# Patient Record
Sex: MALE | Race: WHITE | Employment: UNEMPLOYED | URBAN - METROPOLITAN AREA
[De-identification: names, ages, dates, MRNs, and addresses within clinical notes are randomized per-mention and may not be internally consistent; named-entity substitution may affect disease eponyms.]

---

## 2017-01-20 RX ORDER — ASPIRIN 81 MG
TABLET, DELAYED RELEASE (ENTERIC COATED) ORAL
Qty: 90 TABLET | Refills: 0 | Status: SHIPPED | OUTPATIENT
Start: 2017-01-20 | End: 2017-04-18

## 2017-04-03 ENCOUNTER — APPOINTMENT (OUTPATIENT)
Dept: LAB | Age: 62
End: 2017-04-03
Attending: INTERNAL MEDICINE
Payer: MEDICAID

## 2017-04-03 DIAGNOSIS — E11.9 TYPE II OR UNSPECIFIED TYPE DIABETES MELLITUS WITHOUT MENTION OF COMPLICATION, NOT STATED AS UNCONTROLLED: ICD-10-CM

## 2017-04-03 PROCEDURE — 36415 COLL VENOUS BLD VENIPUNCTURE: CPT

## 2017-04-03 PROCEDURE — 80053 COMPREHEN METABOLIC PANEL: CPT

## 2017-04-03 PROCEDURE — 83036 HEMOGLOBIN GLYCOSYLATED A1C: CPT

## 2017-04-04 DIAGNOSIS — E11.9 TYPE 2 DIABETES MELLITUS WITHOUT COMPLICATION, WITHOUT LONG-TERM CURRENT USE OF INSULIN (HCC): Primary | Chronic | ICD-10-CM

## 2017-04-06 ENCOUNTER — OFFICE VISIT (OUTPATIENT)
Dept: INTERNAL MEDICINE CLINIC | Facility: CLINIC | Age: 62
End: 2017-04-06

## 2017-04-06 VITALS
TEMPERATURE: 98 F | OXYGEN SATURATION: 100 % | RESPIRATION RATE: 15 BRPM | BODY MASS INDEX: 37.9 KG/M2 | DIASTOLIC BLOOD PRESSURE: 71 MMHG | HEART RATE: 62 BPM | WEIGHT: 270.75 LBS | HEIGHT: 70.75 IN | SYSTOLIC BLOOD PRESSURE: 140 MMHG

## 2017-04-06 DIAGNOSIS — I10 ESSENTIAL HYPERTENSION, BENIGN: Chronic | ICD-10-CM

## 2017-04-06 DIAGNOSIS — E11.9 TYPE 2 DIABETES MELLITUS WITHOUT COMPLICATION, WITHOUT LONG-TERM CURRENT USE OF INSULIN (HCC): Primary | Chronic | ICD-10-CM

## 2017-04-06 PROCEDURE — 99213 OFFICE O/P EST LOW 20 MIN: CPT | Performed by: INTERNAL MEDICINE

## 2017-04-06 RX ORDER — HYDROCODONE BITARTRATE AND ACETAMINOPHEN 10; 325 MG/1; MG/1
1 TABLET ORAL 3 TIMES DAILY PRN
Qty: 30 TABLET | Refills: 0 | Status: SHIPPED | OUTPATIENT
Start: 2017-04-06 | End: 2017-09-19

## 2017-04-06 NOTE — PROGRESS NOTES
Norris Estevez  10/31/1955 is a 64year old male.     Patient presents with:  Diabetes: dm and b/p follow up        HPI:   No new cc  No swelling of the feet    Current Outpatient Prescriptions:  HYDROcodone-acetaminophen  MG Oral Tab Take 1 tab Comment: 2-3 per month       REVIEW OF SYSTEMS:   Cardiovascular:   Syncope none. Rapid heart beat at rest no. Change in exercise tolerance no. Chest pain no. Chest pain while awake none. Cold extremities no. Dizziness no. Dyspnea on exertion none.  Faintin

## 2017-04-18 RX ORDER — ASPIRIN 81 MG
TABLET, DELAYED RELEASE (ENTERIC COATED) ORAL
Qty: 90 TABLET | Refills: 0 | Status: SHIPPED | OUTPATIENT
Start: 2017-04-18 | End: 2017-07-20

## 2017-06-06 ENCOUNTER — TELEPHONE (OUTPATIENT)
Dept: INTERNAL MEDICINE CLINIC | Facility: CLINIC | Age: 62
End: 2017-06-06

## 2017-06-06 NOTE — TELEPHONE ENCOUNTER
I received a fax from [employer] [facility] requesting a phone number update. It has been updated and faxed back.

## 2017-06-08 ENCOUNTER — TELEPHONE (OUTPATIENT)
Dept: INTERNAL MEDICINE CLINIC | Facility: CLINIC | Age: 62
End: 2017-06-08

## 2017-07-20 RX ORDER — ASPIRIN 81 MG
TABLET, DELAYED RELEASE (ENTERIC COATED) ORAL
Qty: 90 TABLET | Refills: 0 | Status: SHIPPED | OUTPATIENT
Start: 2017-07-20 | End: 2017-09-19

## 2017-07-20 NOTE — TELEPHONE ENCOUNTER
Medication doesn't fall under protocol. Approve if needed.       Last OV 4/6/17    Last refill 4/18/17 90 tablets

## 2017-08-07 ENCOUNTER — TELEPHONE (OUTPATIENT)
Dept: INTERNAL MEDICINE CLINIC | Facility: CLINIC | Age: 62
End: 2017-08-07

## 2017-08-07 NOTE — TELEPHONE ENCOUNTER
received a call from [employer] stating that glimepiride is in need of PA. Please call [phone] to initiate PA.

## 2017-08-07 NOTE — TELEPHONE ENCOUNTER
Call PA department and was advised that the previous PA had ended 3 days ago. Faxing new PA form this afternoon to be completed and resubmitted. Pt had hit max dose, therefore new PA must be done as well.

## 2017-08-08 ENCOUNTER — TELEPHONE (OUTPATIENT)
Dept: INTERNAL MEDICINE CLINIC | Facility: CLINIC | Age: 62
End: 2017-08-08

## 2017-08-09 ENCOUNTER — TELEPHONE (OUTPATIENT)
Dept: INTERNAL MEDICINE CLINIC | Facility: CLINIC | Age: 62
End: 2017-08-09

## 2017-08-22 ENCOUNTER — TELEPHONE (OUTPATIENT)
Dept: INTERNAL MEDICINE CLINIC | Facility: CLINIC | Age: 62
End: 2017-08-22

## 2017-08-22 DIAGNOSIS — I10 ESSENTIAL HYPERTENSION, BENIGN: Chronic | ICD-10-CM

## 2017-08-22 DIAGNOSIS — E78.00 HYPERCHOLESTEREMIA: Chronic | ICD-10-CM

## 2017-08-22 DIAGNOSIS — Z00.00 BLOOD TESTS FOR ROUTINE GENERAL PHYSICAL EXAMINATION: Primary | ICD-10-CM

## 2017-08-22 DIAGNOSIS — E11.9 DIABETES MELLITUS WITHOUT COMPLICATION (HCC): ICD-10-CM

## 2017-08-22 NOTE — TELEPHONE ENCOUNTER
CPX scheduled pt requests lab orders prior to appt    Future Appointments  Date Time Provider Dmitri Martin   9/19/2017 11:00 AM Dutch Manriquez MD [facility] [place]       Pt advised we are no longer in network w/ [facility] after Sept 30.   Verbalized

## 2017-09-02 DIAGNOSIS — I10 ESSENTIAL HYPERTENSION, BENIGN: Chronic | ICD-10-CM

## 2017-09-05 RX ORDER — AMLODIPINE BESYLATE 10 MG/1
TABLET ORAL
Qty: 90 TABLET | Refills: 0 | Status: SHIPPED | OUTPATIENT
Start: 2017-09-05 | End: 2017-12-01

## 2017-09-05 RX ORDER — LISINOPRIL AND HYDROCHLOROTHIAZIDE 20; 12.5 MG/1; MG/1
TABLET ORAL
Qty: 180 TABLET | Refills: 0 | Status: SHIPPED | OUTPATIENT
Start: 2017-09-05 | End: 2017-12-01

## 2017-09-05 RX ORDER — METOPROLOL SUCCINATE 50 MG/1
TABLET, EXTENDED RELEASE ORAL
Qty: 90 TABLET | Refills: 0 | Status: SHIPPED | OUTPATIENT
Start: 2017-09-05 | End: 2017-12-01

## 2017-09-14 ENCOUNTER — LABORATORY ENCOUNTER (OUTPATIENT)
Dept: LAB | Age: 62
End: 2017-09-14
Attending: INTERNAL MEDICINE
Payer: COMMERCIAL

## 2017-09-14 DIAGNOSIS — Z00.00 BLOOD TESTS FOR ROUTINE GENERAL PHYSICAL EXAMINATION: ICD-10-CM

## 2017-09-14 DIAGNOSIS — I10 ESSENTIAL HYPERTENSION, BENIGN: ICD-10-CM

## 2017-09-14 DIAGNOSIS — E78.00 HYPERCHOLESTEREMIA: Chronic | ICD-10-CM

## 2017-09-14 DIAGNOSIS — E11.9 DIABETES MELLITUS WITHOUT COMPLICATION (HCC): ICD-10-CM

## 2017-09-14 DIAGNOSIS — E11.9 TYPE 2 DIABETES MELLITUS WITHOUT COMPLICATION, WITHOUT LONG-TERM CURRENT USE OF INSULIN (HCC): Chronic | ICD-10-CM

## 2017-09-14 LAB
ALBUMIN SERPL-MCNC: 3.6 G/DL (ref 3.5–4.8)
ALP LIVER SERPL-CCNC: 57 U/L (ref 45–117)
ALT SERPL-CCNC: 20 U/L (ref 17–63)
AST SERPL-CCNC: 13 U/L (ref 15–41)
BASOPHILS # BLD AUTO: 0.08 X10(3) UL (ref 0–0.1)
BASOPHILS NFR BLD AUTO: 0.9 %
BILIRUB SERPL-MCNC: 0.5 MG/DL (ref 0.1–2)
BILIRUB UR QL STRIP.AUTO: NEGATIVE
BUN BLD-MCNC: 26 MG/DL (ref 8–20)
CALCIUM BLD-MCNC: 9.3 MG/DL (ref 8.3–10.3)
CHLORIDE: 102 MMOL/L (ref 101–111)
CHOLEST SMN-MCNC: 172 MG/DL (ref ?–200)
CLARITY UR REFRACT.AUTO: CLEAR
CO2: 29 MMOL/L (ref 22–32)
COMPLEXED PSA SERPL-MCNC: 0.71 NG/ML (ref 0.01–4)
CREAT BLD-MCNC: 1.03 MG/DL (ref 0.7–1.3)
CREAT UR-SCNC: 109 MG/DL
EOSINOPHIL # BLD AUTO: 0.27 X10(3) UL (ref 0–0.3)
EOSINOPHIL NFR BLD AUTO: 3.1 %
ERYTHROCYTE [DISTWIDTH] IN BLOOD BY AUTOMATED COUNT: 13.2 % (ref 11.5–16)
EST. AVERAGE GLUCOSE BLD GHB EST-MCNC: 140 MG/DL (ref 68–126)
GLUCOSE BLD-MCNC: 141 MG/DL (ref 70–99)
GLUCOSE UR STRIP.AUTO-MCNC: NEGATIVE MG/DL
HBA1C MFR BLD HPLC: 6.5 % (ref ?–5.7)
HCT VFR BLD AUTO: 45.7 % (ref 37–53)
HDLC SERPL-MCNC: 45 MG/DL (ref 45–?)
HDLC SERPL: 3.82 {RATIO} (ref ?–4.97)
HGB BLD-MCNC: 15.6 G/DL (ref 13–17)
IMMATURE GRANULOCYTE COUNT: 0.05 X10(3) UL (ref 0–1)
IMMATURE GRANULOCYTE RATIO %: 0.6 %
KETONES UR STRIP.AUTO-MCNC: NEGATIVE MG/DL
LDLC SERPL CALC-MCNC: 107 MG/DL (ref ?–130)
LDLC SERPL-MCNC: 20 MG/DL (ref 5–40)
LEUKOCYTE ESTERASE UR QL STRIP.AUTO: NEGATIVE
LYMPHOCYTES # BLD AUTO: 3.07 X10(3) UL (ref 0.9–4)
LYMPHOCYTES NFR BLD AUTO: 34.8 %
M PROTEIN MFR SERPL ELPH: 8 G/DL (ref 6.1–8.3)
MCH RBC QN AUTO: 32.2 PG (ref 27–33.2)
MCHC RBC AUTO-ENTMCNC: 34.1 G/DL (ref 31–37)
MCV RBC AUTO: 94.2 FL (ref 80–99)
MICROALBUMIN UR-MCNC: 11 MG/DL
MICROALBUMIN/CREAT 24H UR-RTO: 100.9 UG/MG (ref ?–30)
MONOCYTES # BLD AUTO: 0.93 X10(3) UL (ref 0.1–0.6)
MONOCYTES NFR BLD AUTO: 10.5 %
NEUTROPHIL ABS PRELIM: 4.42 X10 (3) UL (ref 1.3–6.7)
NEUTROPHILS # BLD AUTO: 4.42 X10(3) UL (ref 1.3–6.7)
NEUTROPHILS NFR BLD AUTO: 50.1 %
NITRITE UR QL STRIP.AUTO: NEGATIVE
NONHDLC SERPL-MCNC: 127 MG/DL (ref ?–130)
PH UR STRIP.AUTO: 6 [PH] (ref 4.5–8)
PLATELET # BLD AUTO: 179 10(3)UL (ref 150–450)
POTASSIUM SERPL-SCNC: 4.5 MMOL/L (ref 3.6–5.1)
PROT UR STRIP.AUTO-MCNC: NEGATIVE MG/DL
RBC # BLD AUTO: 4.85 X10(6)UL (ref 4.3–5.7)
RBC UR QL AUTO: NEGATIVE
RED CELL DISTRIBUTION WIDTH-SD: 45.3 FL (ref 35.1–46.3)
SODIUM SERPL-SCNC: 137 MMOL/L (ref 136–144)
SP GR UR STRIP.AUTO: 1.02 (ref 1–1.03)
THYROXINE (T4): 9.1 UG/DL (ref 4.5–10.9)
TRIGLYCERIDES: 99 MG/DL (ref ?–150)
UROBILINOGEN UR STRIP.AUTO-MCNC: <2 MG/DL
WBC # BLD AUTO: 8.8 X10(3) UL (ref 4–13)

## 2017-09-14 PROCEDURE — 80061 LIPID PANEL: CPT | Performed by: INTERNAL MEDICINE

## 2017-09-14 PROCEDURE — 84436 ASSAY OF TOTAL THYROXINE: CPT | Performed by: INTERNAL MEDICINE

## 2017-09-14 PROCEDURE — 82570 ASSAY OF URINE CREATININE: CPT | Performed by: INTERNAL MEDICINE

## 2017-09-14 PROCEDURE — 82043 UR ALBUMIN QUANTITATIVE: CPT | Performed by: INTERNAL MEDICINE

## 2017-09-14 PROCEDURE — 80053 COMPREHEN METABOLIC PANEL: CPT | Performed by: INTERNAL MEDICINE

## 2017-09-14 PROCEDURE — 84153 ASSAY OF PSA TOTAL: CPT | Performed by: INTERNAL MEDICINE

## 2017-09-14 PROCEDURE — 36415 COLL VENOUS BLD VENIPUNCTURE: CPT | Performed by: INTERNAL MEDICINE

## 2017-09-14 PROCEDURE — 85025 COMPLETE CBC W/AUTO DIFF WBC: CPT | Performed by: INTERNAL MEDICINE

## 2017-09-14 PROCEDURE — 81003 URINALYSIS AUTO W/O SCOPE: CPT | Performed by: INTERNAL MEDICINE

## 2017-09-14 PROCEDURE — 83036 HEMOGLOBIN GLYCOSYLATED A1C: CPT | Performed by: INTERNAL MEDICINE

## 2017-09-19 ENCOUNTER — OFFICE VISIT (OUTPATIENT)
Dept: INTERNAL MEDICINE CLINIC | Facility: CLINIC | Age: 62
End: 2017-09-19

## 2017-09-19 VITALS
HEART RATE: 56 BPM | DIASTOLIC BLOOD PRESSURE: 70 MMHG | TEMPERATURE: 99 F | OXYGEN SATURATION: 99 % | BODY MASS INDEX: 37.1 KG/M2 | SYSTOLIC BLOOD PRESSURE: 122 MMHG | HEIGHT: 70.75 IN | WEIGHT: 265 LBS | RESPIRATION RATE: 16 BRPM

## 2017-09-19 DIAGNOSIS — Z00.00 ROUTINE GENERAL MEDICAL EXAMINATION AT A HEALTH CARE FACILITY: Primary | ICD-10-CM

## 2017-09-19 DIAGNOSIS — E11.9 TYPE 2 DIABETES MELLITUS WITHOUT COMPLICATION, WITHOUT LONG-TERM CURRENT USE OF INSULIN (HCC): Chronic | ICD-10-CM

## 2017-09-19 DIAGNOSIS — I10 ESSENTIAL HYPERTENSION, BENIGN: Chronic | ICD-10-CM

## 2017-09-19 PROCEDURE — 99396 PREV VISIT EST AGE 40-64: CPT | Performed by: INTERNAL MEDICINE

## 2017-09-19 PROCEDURE — 93000 ELECTROCARDIOGRAM COMPLETE: CPT | Performed by: INTERNAL MEDICINE

## 2017-09-19 NOTE — PROGRESS NOTES
Mal Estevez  10/31/1955 is a 64year old male. Patient presents with:  Physical      HPI:   Here for cpx -see below     Current Outpatient Prescriptions:  aspirin 81 MG Oral Tab Take 81 mg by mouth daily.  Disp:  Rfl:    AMLODIPINE BESYLATE 10 M SYSTEMS:     General/Constitutional:   General able to do usual activities, good exercise tolerance, good general state of health, no fatigue, no fever, no weakness . HEENT/Neck:   Head no dizziness, no lightheadedness.  Eyes normal vision, no redness , n consciousness, memory loss, seizures, paresthesias, weakness. Tingling/numbness none. Trouble with balance none. Psychiatric:   Patient denies anxiety, depression, hallucinations. Insomnia none/no hx of sleep disorder. Memory loss none.                 EX normalInvoluntary movements and wasting none. Reflexes: normal.   Sensory: all sensory modalities normal.   LYMPHATICS:   Cervical: none. Groin: no adenopathy . Inguinal: no adenopathy. Supraclavicular: none.    DERMATOLOGY:   Rash: no. But has mulu

## 2017-09-28 ENCOUNTER — PATIENT MESSAGE (OUTPATIENT)
Dept: INTERNAL MEDICINE CLINIC | Facility: CLINIC | Age: 62
End: 2017-09-28

## 2017-10-04 DIAGNOSIS — E11.9 TYPE 2 DIABETES MELLITUS WITHOUT COMPLICATION, WITHOUT LONG-TERM CURRENT USE OF INSULIN (HCC): Chronic | ICD-10-CM

## 2017-10-05 RX ORDER — GLIMEPIRIDE 2 MG/1
TABLET ORAL
Qty: 135 TABLET | Refills: 0 | Status: SHIPPED | OUTPATIENT
Start: 2017-10-05 | End: 2017-12-28

## 2017-10-05 RX ORDER — GLIMEPIRIDE 2 MG/1
TABLET ORAL
Qty: 135 TABLET | Refills: 3
Start: 2017-10-05

## 2017-10-05 NOTE — TELEPHONE ENCOUNTER
From: Oliva Estevez  Sent: 10/4/2017 9:14 AM CDT  Subject: Medication Renewal Request    Cecilgeorge Eriberto would like a refill of the following medications:     glimepiride 2 MG Oral Tab Jerilyn Pierson MD]    Preferred pharmacy: [place] [id number]

## 2017-11-16 RX ORDER — ASPIRIN 81 MG
TABLET, DELAYED RELEASE (ENTERIC COATED) ORAL
Qty: 90 TABLET | Refills: 3 | Status: SHIPPED | OUTPATIENT
Start: 2017-11-16 | End: 2018-03-29

## 2017-12-01 DIAGNOSIS — I10 ESSENTIAL HYPERTENSION, BENIGN: Chronic | ICD-10-CM

## 2017-12-01 RX ORDER — METOPROLOL SUCCINATE 50 MG/1
TABLET, EXTENDED RELEASE ORAL
Qty: 90 TABLET | Refills: 0 | Status: SHIPPED | OUTPATIENT
Start: 2017-12-01 | End: 2018-02-26

## 2017-12-01 RX ORDER — LISINOPRIL AND HYDROCHLOROTHIAZIDE 20; 12.5 MG/1; MG/1
TABLET ORAL
Qty: 180 TABLET | Refills: 0 | Status: SHIPPED | OUTPATIENT
Start: 2017-12-01 | End: 2018-02-26

## 2017-12-01 RX ORDER — AMLODIPINE BESYLATE 10 MG/1
TABLET ORAL
Qty: 90 TABLET | Refills: 0 | Status: SHIPPED | OUTPATIENT
Start: 2017-12-01 | End: 2018-02-26

## 2017-12-28 DIAGNOSIS — E11.9 TYPE 2 DIABETES MELLITUS WITHOUT COMPLICATION, WITHOUT LONG-TERM CURRENT USE OF INSULIN (HCC): Chronic | ICD-10-CM

## 2017-12-28 RX ORDER — GLIMEPIRIDE 2 MG/1
TABLET ORAL
Qty: 135 TABLET | Refills: 0 | Status: SHIPPED | OUTPATIENT
Start: 2017-12-28 | End: 2018-03-23

## 2018-02-26 DIAGNOSIS — I10 ESSENTIAL HYPERTENSION, BENIGN: Chronic | ICD-10-CM

## 2018-02-26 RX ORDER — AMLODIPINE BESYLATE 10 MG/1
TABLET ORAL
Qty: 90 TABLET | Refills: 0 | Status: SHIPPED | OUTPATIENT
Start: 2018-02-26 | End: 2018-05-23

## 2018-02-26 RX ORDER — LISINOPRIL AND HYDROCHLOROTHIAZIDE 20; 12.5 MG/1; MG/1
TABLET ORAL
Qty: 180 TABLET | Refills: 0 | Status: SHIPPED | OUTPATIENT
Start: 2018-02-26 | End: 2018-05-23

## 2018-02-26 RX ORDER — METOPROLOL SUCCINATE 50 MG/1
TABLET, EXTENDED RELEASE ORAL
Qty: 90 TABLET | Refills: 0 | Status: SHIPPED | OUTPATIENT
Start: 2018-02-26 | End: 2018-05-23

## 2018-03-16 NOTE — TELEPHONE ENCOUNTER
From: Sridhar Estevez  To: Leandra Ponce MD  Sent: 9/28/2017 2:27 PM CDT  Subject: Other    I had my dialated retinal eye exam done on 9/21/2017 at [facility]. They are going to send this information to your office.    Taty Statement Selected

## 2018-03-23 DIAGNOSIS — E11.9 TYPE 2 DIABETES MELLITUS WITHOUT COMPLICATION, WITHOUT LONG-TERM CURRENT USE OF INSULIN (HCC): Chronic | ICD-10-CM

## 2018-03-26 ENCOUNTER — TELEPHONE (OUTPATIENT)
Dept: INTERNAL MEDICINE CLINIC | Facility: CLINIC | Age: 63
End: 2018-03-26

## 2018-03-26 DIAGNOSIS — E11.9 DIABETES MELLITUS WITHOUT COMPLICATION (HCC): ICD-10-CM

## 2018-03-26 DIAGNOSIS — E78.00 HYPERCHOLESTEREMIA: Primary | Chronic | ICD-10-CM

## 2018-03-26 DIAGNOSIS — E11.9 TYPE 2 DIABETES MELLITUS WITHOUT COMPLICATION, WITHOUT LONG-TERM CURRENT USE OF INSULIN (HCC): Chronic | ICD-10-CM

## 2018-03-26 RX ORDER — GLIMEPIRIDE 2 MG/1
TABLET ORAL
Qty: 45 TABLET | Refills: 0 | Status: SHIPPED | OUTPATIENT
Start: 2018-03-26 | End: 2018-03-26

## 2018-03-26 NOTE — TELEPHONE ENCOUNTER
Patient will have labs done this week. Patient also has a follow up appointment scheduled.  30 day refill sent

## 2018-03-26 NOTE — TELEPHONE ENCOUNTER
Patient scheduled appt for diabetes f/u.  Requesting to have blood orders entered prior  Future Appointments  Date Time Provider Dmitri Martin   3/29/2018 9:30 AM Mónica Kyle MD [facility] [place]     Patient also requesting refill for:  GLIMEPIRIDE

## 2018-03-27 ENCOUNTER — APPOINTMENT (OUTPATIENT)
Dept: LAB | Age: 63
End: 2018-03-27
Attending: INTERNAL MEDICINE
Payer: COMMERCIAL

## 2018-03-27 DIAGNOSIS — E11.9 DIABETES MELLITUS WITHOUT COMPLICATION (HCC): ICD-10-CM

## 2018-03-27 DIAGNOSIS — E78.00 HYPERCHOLESTEREMIA: Chronic | ICD-10-CM

## 2018-03-27 LAB
ALBUMIN SERPL-MCNC: 3.5 G/DL (ref 3.5–4.8)
ALP LIVER SERPL-CCNC: 56 U/L (ref 45–117)
ALT SERPL-CCNC: 16 U/L (ref 17–63)
AST SERPL-CCNC: 13 U/L (ref 15–41)
BILIRUB SERPL-MCNC: 0.7 MG/DL (ref 0.1–2)
BUN BLD-MCNC: 18 MG/DL (ref 8–20)
CALCIUM BLD-MCNC: 9.3 MG/DL (ref 8.3–10.3)
CHLORIDE: 100 MMOL/L (ref 101–111)
CHOLEST SMN-MCNC: 180 MG/DL (ref ?–200)
CO2: 28 MMOL/L (ref 22–32)
CREAT BLD-MCNC: 0.99 MG/DL (ref 0.7–1.3)
EST. AVERAGE GLUCOSE BLD GHB EST-MCNC: 146 MG/DL (ref 68–126)
GLUCOSE BLD-MCNC: 152 MG/DL (ref 70–99)
HBA1C MFR BLD HPLC: 6.7 % (ref ?–5.7)
HDLC SERPL-MCNC: 48 MG/DL (ref 45–?)
HDLC SERPL: 3.75 {RATIO} (ref ?–4.97)
LDLC SERPL CALC-MCNC: 113 MG/DL (ref ?–130)
M PROTEIN MFR SERPL ELPH: 8 G/DL (ref 6.1–8.3)
NONHDLC SERPL-MCNC: 132 MG/DL (ref ?–130)
POTASSIUM SERPL-SCNC: 4.3 MMOL/L (ref 3.6–5.1)
SODIUM SERPL-SCNC: 134 MMOL/L (ref 136–144)
TRIGL SERPL-MCNC: 96 MG/DL (ref ?–150)
VLDLC SERPL CALC-MCNC: 19 MG/DL (ref 5–40)

## 2018-03-27 PROCEDURE — 80061 LIPID PANEL: CPT | Performed by: INTERNAL MEDICINE

## 2018-03-27 PROCEDURE — 36415 COLL VENOUS BLD VENIPUNCTURE: CPT | Performed by: INTERNAL MEDICINE

## 2018-03-27 PROCEDURE — 80053 COMPREHEN METABOLIC PANEL: CPT | Performed by: INTERNAL MEDICINE

## 2018-03-27 PROCEDURE — 83036 HEMOGLOBIN GLYCOSYLATED A1C: CPT | Performed by: INTERNAL MEDICINE

## 2018-03-27 RX ORDER — GLIMEPIRIDE 2 MG/1
TABLET ORAL
Qty: 135 TABLET | Refills: 0 | Status: SHIPPED | OUTPATIENT
Start: 2018-03-27 | End: 2018-06-21

## 2018-03-29 ENCOUNTER — OFFICE VISIT (OUTPATIENT)
Dept: INTERNAL MEDICINE CLINIC | Facility: CLINIC | Age: 63
End: 2018-03-29

## 2018-03-29 VITALS
OXYGEN SATURATION: 93 % | HEIGHT: 71 IN | BODY MASS INDEX: 39.34 KG/M2 | WEIGHT: 281 LBS | SYSTOLIC BLOOD PRESSURE: 132 MMHG | RESPIRATION RATE: 16 BRPM | HEART RATE: 58 BPM | DIASTOLIC BLOOD PRESSURE: 70 MMHG | TEMPERATURE: 98 F

## 2018-03-29 DIAGNOSIS — E11.9 TYPE 2 DIABETES MELLITUS WITHOUT COMPLICATION, WITHOUT LONG-TERM CURRENT USE OF INSULIN (HCC): Primary | Chronic | ICD-10-CM

## 2018-03-29 PROCEDURE — 99213 OFFICE O/P EST LOW 20 MIN: CPT | Performed by: INTERNAL MEDICINE

## 2018-03-29 PROCEDURE — 90471 IMMUNIZATION ADMIN: CPT | Performed by: INTERNAL MEDICINE

## 2018-03-29 PROCEDURE — 90732 PPSV23 VACC 2 YRS+ SUBQ/IM: CPT | Performed by: INTERNAL MEDICINE

## 2018-03-29 RX ORDER — HYDROCODONE BITARTRATE AND ACETAMINOPHEN 7.5; 325 MG/1; MG/1
1 TABLET ORAL EVERY 4 HOURS PRN
Qty: 30 TABLET | Refills: 0 | Status: SHIPPED | OUTPATIENT
Start: 2018-03-29 | End: 2018-04-28

## 2018-03-29 NOTE — PROGRESS NOTES
Luis Estevez  10/31/1955 is a 58year old male. Patient presents with: Follow - Up    bp ck    HPI:   DIABETES MELLITUS:   The diet that the patient has been following is the American Diabetes Association diet.    The frequency of the monitoring blood pressure    • Other and unspecified hyperlipidemia    • Rotator cuff disorder     LT   • Type II or unspecified type diabetes mellitus without mention of complication, not stated as uncontrolled    • Unspecified essential hypertension    • Varicose v this visit. The patient indicates understanding of these issues and agrees to the plan. The patient is asked to No Follow-up on file. .  Ck sugar # pre and post meals ,compliance with diet/exercise enforce.  Weight counseling discussed     Rosendo Kenyon

## 2018-05-23 DIAGNOSIS — I10 ESSENTIAL HYPERTENSION, BENIGN: Chronic | ICD-10-CM

## 2018-05-24 RX ORDER — METOPROLOL SUCCINATE 50 MG/1
50 TABLET, EXTENDED RELEASE ORAL DAILY
Qty: 90 TABLET | Refills: 0 | Status: SHIPPED | OUTPATIENT
Start: 2018-05-24 | End: 2018-08-21

## 2018-05-24 RX ORDER — LISINOPRIL AND HYDROCHLOROTHIAZIDE 20; 12.5 MG/1; MG/1
2 TABLET ORAL DAILY
Qty: 180 TABLET | Refills: 0 | Status: SHIPPED | OUTPATIENT
Start: 2018-05-24 | End: 2018-08-21

## 2018-05-24 RX ORDER — AMLODIPINE BESYLATE 10 MG/1
10 TABLET ORAL DAILY
Qty: 90 TABLET | Refills: 0 | Status: SHIPPED | OUTPATIENT
Start: 2018-05-24 | End: 2018-08-21

## 2018-05-24 NOTE — TELEPHONE ENCOUNTER
Refill requested:   Lisinopril - Hydro 20-12.5 2 daily  Metoprolol ER 50 daily  Amlodipine 10 daily    Passed protocol      Last refill:   2/26/18 Lisinopril 2qd #180 NR  2/26/18 Metoprolol 50 #90 NR  2/26/18 Amlodipine 10 mg #90 NR    Relevant Labs: 3/27/

## 2018-06-21 DIAGNOSIS — E11.9 TYPE 2 DIABETES MELLITUS WITHOUT COMPLICATION, WITHOUT LONG-TERM CURRENT USE OF INSULIN (HCC): Chronic | ICD-10-CM

## 2018-06-21 RX ORDER — GLIMEPIRIDE 2 MG/1
TABLET ORAL
Qty: 135 TABLET | Refills: 0 | Status: SHIPPED | OUTPATIENT
Start: 2018-06-21 | End: 2018-09-13

## 2018-07-27 ENCOUNTER — APPOINTMENT (OUTPATIENT)
Dept: GENERAL RADIOLOGY | Age: 63
End: 2018-07-27
Attending: PHYSICIAN ASSISTANT
Payer: COMMERCIAL

## 2018-07-27 ENCOUNTER — HOSPITAL ENCOUNTER (OUTPATIENT)
Age: 63
Discharge: HOME | End: 2018-07-27
Payer: COMMERCIAL

## 2018-07-27 VITALS
OXYGEN SATURATION: 99 % | SYSTOLIC BLOOD PRESSURE: 148 MMHG | DIASTOLIC BLOOD PRESSURE: 68 MMHG | HEART RATE: 55 BPM | TEMPERATURE: 98 F | RESPIRATION RATE: 18 BRPM

## 2018-07-27 DIAGNOSIS — S61.213A LACERATION OF LEFT MIDDLE FINGER WITHOUT FOREIGN BODY WITHOUT DAMAGE TO NAIL, INITIAL ENCOUNTER: ICD-10-CM

## 2018-07-27 DIAGNOSIS — S62.653B OPEN NONDISPLACED FRACTURE OF MIDDLE PHALANX OF LEFT MIDDLE FINGER, INITIAL ENCOUNTER: Primary | ICD-10-CM

## 2018-07-27 DIAGNOSIS — T14.8XXA SKIN AVULSION: ICD-10-CM

## 2018-07-27 PROCEDURE — 73140 X-RAY EXAM OF FINGER(S): CPT | Performed by: PHYSICIAN ASSISTANT

## 2018-07-27 PROCEDURE — 26720 TREAT FINGER FRACTURE EACH: CPT

## 2018-07-27 PROCEDURE — 90471 IMMUNIZATION ADMIN: CPT

## 2018-07-27 PROCEDURE — 99213 OFFICE O/P EST LOW 20 MIN: CPT

## 2018-07-27 PROCEDURE — 99203 OFFICE O/P NEW LOW 30 MIN: CPT

## 2018-07-27 PROCEDURE — 12002 RPR S/N/AX/GEN/TRNK2.6-7.5CM: CPT

## 2018-07-27 RX ORDER — AMOXICILLIN AND CLAVULANATE POTASSIUM 875; 125 MG/1; MG/1
1 TABLET, FILM COATED ORAL 2 TIMES DAILY
Qty: 20 TABLET | Refills: 0 | Status: SHIPPED | OUTPATIENT
Start: 2018-07-27 | End: 2018-08-06

## 2018-07-27 NOTE — ED INITIAL ASSESSMENT (HPI)
C/O laceration to left index and middle finger that occurred today, sts that he caught it on a saw blade.

## 2018-07-27 NOTE — ED PROVIDER NOTES
Patient Seen in: [facility]    History   Patient presents with:  Laceration Abrasion (integumentary)    Stated Complaint: laceration left index and 2nd digit    HPI    Patient is a pleasant 58-year-old male.   He is right-hand do except as noted above.     Physical Exam   ED Triage Vitals [07/27/18 1508]  BP: 148/68  Pulse: 55  Resp: 18  Temp: 98.4 °F (36.9 °C)  Temp src: Temporal  SpO2: 99 %  O2 Device: None (Room air)    Current:/68   Pulse 55   Temp 98.4 °F (36.9 °C) (Tempo to the left index finger is fairly superficial to the distal pad. The laceration to the middle finger is more significant. Plain film x-ray will be performed to see if there is bony involvement. Patient's tetanus was updated.   Both wounds will be thorou

## 2018-08-06 ENCOUNTER — HOSPITAL ENCOUNTER (OUTPATIENT)
Age: 63
Discharge: HOME | End: 2018-08-06
Payer: COMMERCIAL

## 2018-08-06 VITALS
HEART RATE: 61 BPM | RESPIRATION RATE: 18 BRPM | OXYGEN SATURATION: 98 % | DIASTOLIC BLOOD PRESSURE: 74 MMHG | SYSTOLIC BLOOD PRESSURE: 128 MMHG | TEMPERATURE: 99 F

## 2018-08-06 DIAGNOSIS — Z48.02 ENCOUNTER FOR REMOVAL OF SUTURES: Primary | ICD-10-CM

## 2018-08-06 NOTE — ED PROVIDER NOTES
Patient Seen in: [facility]    History   Patient presents with:  Suture Removal    Stated Complaint: suture removal    19-year-old male presents today with need of suture removal.  Patient was here 1 week ago to have sutures shayne stated complaint: suture removal  Other systems are as noted in HPI. Constitutional and vital signs reviewed. All other systems reviewed and negative except as noted above.     Physical Exam   ED Triage Vitals [08/06/18 1014]  BP: 128/74  Pulse: 61  R

## 2018-08-21 DIAGNOSIS — I10 ESSENTIAL HYPERTENSION, BENIGN: Chronic | ICD-10-CM

## 2018-08-21 RX ORDER — LISINOPRIL AND HYDROCHLOROTHIAZIDE 20; 12.5 MG/1; MG/1
2 TABLET ORAL DAILY
Qty: 180 TABLET | Refills: 0 | Status: SHIPPED | OUTPATIENT
Start: 2018-08-21 | End: 2018-11-16

## 2018-08-21 RX ORDER — AMLODIPINE BESYLATE 10 MG/1
TABLET ORAL
Qty: 90 TABLET | Refills: 0 | Status: SHIPPED | OUTPATIENT
Start: 2018-08-21 | End: 2018-11-12

## 2018-08-21 RX ORDER — METOPROLOL SUCCINATE 50 MG/1
TABLET, EXTENDED RELEASE ORAL
Qty: 90 TABLET | Refills: 0 | Status: SHIPPED | OUTPATIENT
Start: 2018-08-21 | End: 2018-09-22

## 2018-08-21 NOTE — TELEPHONE ENCOUNTER
Medication(s) to Refill:   Pending Prescriptions Disp Refills    AMLODIPINE BESYLATE 10 MG Oral Tab [Pharmacy Med Name: AMLODIPINE BESYLATE 10MG TABLETS] 90 tablet 0     Sig: TAKE 1 TABLET(10 MG) BY MOUTH DAILY      METOPROLOL SUCCINATE ER 50 MG Oral Table CO2 22.0 - 32.0 mmol/L 28.0    Resulting Agency  [facility]      Specimen Collected: 03/27/18  8:42 AM Last Resulted: 03/27/18 11:54 AM

## 2018-09-13 DIAGNOSIS — E11.9 TYPE 2 DIABETES MELLITUS WITHOUT COMPLICATION, WITHOUT LONG-TERM CURRENT USE OF INSULIN (HCC): Chronic | ICD-10-CM

## 2018-09-14 DIAGNOSIS — E11.9 TYPE 2 DIABETES MELLITUS WITHOUT COMPLICATION, WITHOUT LONG-TERM CURRENT USE OF INSULIN (HCC): Chronic | ICD-10-CM

## 2018-09-14 RX ORDER — GLIMEPIRIDE 2 MG/1
TABLET ORAL
Qty: 45 TABLET | Refills: 0 | Status: SHIPPED | OUTPATIENT
Start: 2018-09-14 | End: 2018-09-22

## 2018-09-14 NOTE — TELEPHONE ENCOUNTER
Passed protocol - Spoke with patient, he wasn't able to schedule an appointment over the phone and stated that he will call back to schedule. I stated that I can only give him 30 days until he is seen in office. Patient expressed understanding.      Elvin Piña

## 2018-09-17 ENCOUNTER — TELEPHONE (OUTPATIENT)
Dept: INTERNAL MEDICINE CLINIC | Facility: CLINIC | Age: 63
End: 2018-09-17

## 2018-09-17 DIAGNOSIS — Z00.00 LABORATORY EXAMINATION ORDERED AS PART OF A ROUTINE GENERAL MEDICAL EXAMINATION: Primary | ICD-10-CM

## 2018-09-17 DIAGNOSIS — E11.8 TYPE 2 DIABETES MELLITUS WITH COMPLICATION, UNSPECIFIED WHETHER LONG TERM INSULIN USE: ICD-10-CM

## 2018-09-17 DIAGNOSIS — E78.00 HYPERCHOLESTEREMIA: ICD-10-CM

## 2018-09-17 RX ORDER — GLIMEPIRIDE 2 MG/1
TABLET ORAL
Qty: 135 TABLET | Refills: 0 | OUTPATIENT
Start: 2018-09-17

## 2018-09-17 NOTE — TELEPHONE ENCOUNTER
Patient requesting lab orders to be entered prior to upcoming physical    Future Appointments   Date Time Provider Dmitri Martin   9/22/2018 10:00 AM David White MD [facility] [place]

## 2018-09-17 NOTE — TELEPHONE ENCOUNTER
1st attempt - [employer] message sent.      Requesting Glimepiride 2 mg Tablets  LOV: 3/29/18  RTC: 6 months

## 2018-09-18 ENCOUNTER — LAB ENCOUNTER (OUTPATIENT)
Dept: LAB | Age: 63
End: 2018-09-18
Attending: INTERNAL MEDICINE
Payer: COMMERCIAL

## 2018-09-18 DIAGNOSIS — Z00.00 LABORATORY EXAMINATION ORDERED AS PART OF A ROUTINE GENERAL MEDICAL EXAMINATION: ICD-10-CM

## 2018-09-18 DIAGNOSIS — E11.8 TYPE 2 DIABETES MELLITUS WITH COMPLICATION, UNSPECIFIED WHETHER LONG TERM INSULIN USE: ICD-10-CM

## 2018-09-18 DIAGNOSIS — E78.00 HYPERCHOLESTEREMIA: ICD-10-CM

## 2018-09-18 LAB
ALBUMIN SERPL-MCNC: 3.6 G/DL (ref 3.5–4.8)
ALBUMIN/GLOB SERPL: 0.9 {RATIO} (ref 1–2)
ALP LIVER SERPL-CCNC: 54 U/L (ref 45–117)
ALT SERPL-CCNC: 16 U/L (ref 17–63)
ANION GAP SERPL CALC-SCNC: 7 MMOL/L (ref 0–18)
AST SERPL-CCNC: 17 U/L (ref 15–41)
BASOPHILS # BLD AUTO: 0.09 X10(3) UL (ref 0–0.1)
BASOPHILS NFR BLD AUTO: 1.1 %
BILIRUB SERPL-MCNC: 0.6 MG/DL (ref 0.1–2)
BILIRUB UR QL STRIP.AUTO: NEGATIVE
BUN BLD-MCNC: 19 MG/DL (ref 8–20)
BUN/CREAT SERPL: 19.8 (ref 10–20)
CALCIUM BLD-MCNC: 9.4 MG/DL (ref 8.3–10.3)
CHLORIDE SERPL-SCNC: 101 MMOL/L (ref 101–111)
CHOLEST SMN-MCNC: 175 MG/DL (ref ?–200)
CLARITY UR REFRACT.AUTO: CLEAR
CO2 SERPL-SCNC: 25 MMOL/L (ref 22–32)
COLOR UR AUTO: YELLOW
COMPLEXED PSA SERPL-MCNC: 0.7 NG/ML (ref 0.01–4)
CREAT BLD-MCNC: 0.96 MG/DL (ref 0.7–1.3)
CREAT UR-SCNC: 33.9 MG/DL
EOSINOPHIL # BLD AUTO: 0.26 X10(3) UL (ref 0–0.3)
EOSINOPHIL NFR BLD AUTO: 3.1 %
ERYTHROCYTE [DISTWIDTH] IN BLOOD BY AUTOMATED COUNT: 13.7 % (ref 11.5–16)
EST. AVERAGE GLUCOSE BLD GHB EST-MCNC: 120 MG/DL (ref 68–126)
GLOBULIN PLAS-MCNC: 4.1 G/DL (ref 2.5–4)
GLUCOSE BLD-MCNC: 118 MG/DL (ref 70–99)
GLUCOSE UR STRIP.AUTO-MCNC: NEGATIVE MG/DL
HBA1C MFR BLD HPLC: 5.8 % (ref ?–5.7)
HCT VFR BLD AUTO: 46.7 % (ref 37–53)
HDLC SERPL-MCNC: 50 MG/DL (ref 40–59)
HGB BLD-MCNC: 15.5 G/DL (ref 13–17)
IMMATURE GRANULOCYTE COUNT: 0.06 X10(3) UL (ref 0–1)
IMMATURE GRANULOCYTE RATIO %: 0.7 %
KETONES UR STRIP.AUTO-MCNC: NEGATIVE MG/DL
LDLC SERPL CALC-MCNC: 108 MG/DL (ref ?–100)
LEUKOCYTE ESTERASE UR QL STRIP.AUTO: NEGATIVE
LYMPHOCYTES # BLD AUTO: 3.14 X10(3) UL (ref 0.9–4)
LYMPHOCYTES NFR BLD AUTO: 38 %
M PROTEIN MFR SERPL ELPH: 7.7 G/DL (ref 6.1–8.3)
MCH RBC QN AUTO: 31.4 PG (ref 27–33.2)
MCHC RBC AUTO-ENTMCNC: 33.2 G/DL (ref 31–37)
MCV RBC AUTO: 94.7 FL (ref 80–99)
MICROALBUMIN UR-MCNC: 9.99 MG/DL
MICROALBUMIN/CREAT 24H UR-RTO: 294.7 UG/MG (ref ?–30)
MONOCYTES # BLD AUTO: 0.95 X10(3) UL (ref 0.1–1)
MONOCYTES NFR BLD AUTO: 11.5 %
NEUTROPHIL ABS PRELIM: 3.76 X10 (3) UL (ref 1.3–6.7)
NEUTROPHILS # BLD AUTO: 3.76 X10(3) UL (ref 1.3–6.7)
NEUTROPHILS NFR BLD AUTO: 45.6 %
NITRITE UR QL STRIP.AUTO: NEGATIVE
NONHDLC SERPL-MCNC: 125 MG/DL (ref ?–130)
OSMOLALITY SERPL CALC.SUM OF ELEC: 279 MOSM/KG (ref 275–295)
PH UR STRIP.AUTO: 6 [PH] (ref 4.5–8)
PLATELET # BLD AUTO: 201 10(3)UL (ref 150–450)
POTASSIUM SERPL-SCNC: 4.3 MMOL/L (ref 3.6–5.1)
PROT UR STRIP.AUTO-MCNC: NEGATIVE MG/DL
RBC # BLD AUTO: 4.93 X10(6)UL (ref 4.3–5.7)
RBC UR QL AUTO: NEGATIVE
RED CELL DISTRIBUTION WIDTH-SD: 48.6 FL (ref 35.1–46.3)
SODIUM SERPL-SCNC: 133 MMOL/L (ref 136–144)
SP GR UR STRIP.AUTO: 1.01 (ref 1–1.03)
T4 SERPL-MCNC: 8.8 UG/DL (ref 4.5–10.9)
TRIGL SERPL-MCNC: 87 MG/DL (ref 30–149)
UROBILINOGEN UR STRIP.AUTO-MCNC: <2 MG/DL
VLDLC SERPL CALC-MCNC: 17 MG/DL (ref 0–30)
WBC # BLD AUTO: 8.3 X10(3) UL (ref 4–13)

## 2018-09-18 PROCEDURE — 84436 ASSAY OF TOTAL THYROXINE: CPT | Performed by: INTERNAL MEDICINE

## 2018-09-18 PROCEDURE — 83036 HEMOGLOBIN GLYCOSYLATED A1C: CPT | Performed by: INTERNAL MEDICINE

## 2018-09-18 PROCEDURE — 81003 URINALYSIS AUTO W/O SCOPE: CPT | Performed by: INTERNAL MEDICINE

## 2018-09-18 PROCEDURE — 80061 LIPID PANEL: CPT | Performed by: INTERNAL MEDICINE

## 2018-09-18 PROCEDURE — 80053 COMPREHEN METABOLIC PANEL: CPT | Performed by: INTERNAL MEDICINE

## 2018-09-18 PROCEDURE — 85025 COMPLETE CBC W/AUTO DIFF WBC: CPT | Performed by: INTERNAL MEDICINE

## 2018-09-18 PROCEDURE — 82043 UR ALBUMIN QUANTITATIVE: CPT | Performed by: INTERNAL MEDICINE

## 2018-09-18 PROCEDURE — 36415 COLL VENOUS BLD VENIPUNCTURE: CPT | Performed by: INTERNAL MEDICINE

## 2018-09-18 PROCEDURE — 82570 ASSAY OF URINE CREATININE: CPT | Performed by: INTERNAL MEDICINE

## 2018-09-18 PROCEDURE — 84153 ASSAY OF PSA TOTAL: CPT | Performed by: INTERNAL MEDICINE

## 2018-09-22 ENCOUNTER — OFFICE VISIT (OUTPATIENT)
Dept: INTERNAL MEDICINE CLINIC | Facility: CLINIC | Age: 63
End: 2018-09-22
Payer: COMMERCIAL

## 2018-09-22 VITALS
HEART RATE: 53 BPM | DIASTOLIC BLOOD PRESSURE: 82 MMHG | HEIGHT: 70.5 IN | BODY MASS INDEX: 38.33 KG/M2 | RESPIRATION RATE: 12 BRPM | WEIGHT: 270.75 LBS | SYSTOLIC BLOOD PRESSURE: 132 MMHG | TEMPERATURE: 98 F

## 2018-09-22 DIAGNOSIS — Z00.00 ROUTINE GENERAL MEDICAL EXAMINATION AT A HEALTH CARE FACILITY: Primary | ICD-10-CM

## 2018-09-22 DIAGNOSIS — I10 ESSENTIAL HYPERTENSION, BENIGN: ICD-10-CM

## 2018-09-22 DIAGNOSIS — E11.9 TYPE 2 DIABETES MELLITUS WITHOUT COMPLICATION, WITHOUT LONG-TERM CURRENT USE OF INSULIN (HCC): Chronic | ICD-10-CM

## 2018-09-22 DIAGNOSIS — E11.9 DIABETES MELLITUS WITHOUT COMPLICATION (HCC): ICD-10-CM

## 2018-09-22 PROCEDURE — 99396 PREV VISIT EST AGE 40-64: CPT | Performed by: INTERNAL MEDICINE

## 2018-09-22 PROCEDURE — 93000 ELECTROCARDIOGRAM COMPLETE: CPT | Performed by: INTERNAL MEDICINE

## 2018-09-22 RX ORDER — GLIMEPIRIDE 2 MG/1
2 TABLET ORAL
Qty: 45 TABLET | Refills: 0 | COMMUNITY
Start: 2018-09-22 | End: 2019-03-16

## 2018-09-22 RX ORDER — INFLUENZA A VIRUS A/SINGAPORE/GP1908/2015 IVR-180A (H1N1) ANTIGEN (PROPIOLACTONE INACTIVATED), INFLUENZA A VIRUS A/HONG KONG/4801/2014 X-263B (H3N2) ANTIGEN (PROPIOLACTONE INACTIVATED), INFLUENZA B VIRUS B/BRISBANE/46/2015 ANTIGEN (PROPIOLACTONE INACTIVATED), AND INFLUENZA B VIRUS B/PHUKET/3073/2013 BVR-1B ANTIGEN (PROPIOLACTONE INACTIVATED) 15; 15; 15; 15 UG/.5ML; UG/.5ML; UG/.5ML; UG/.5ML
INJECTION, SUSPENSION INTRAMUSCULAR
Refills: 0 | COMMUNITY
Start: 2018-09-18 | End: 2019-11-18

## 2018-09-22 RX ORDER — METOPROLOL SUCCINATE 25 MG/1
25 TABLET, EXTENDED RELEASE ORAL DAILY
Qty: 90 TABLET | Refills: 0 | Status: SHIPPED | OUTPATIENT
Start: 2018-09-22 | End: 2018-12-17

## 2018-09-22 NOTE — PROGRESS NOTES
Sohan Estevez [facility] 10/31/1955 is a 58year old male.     Patient presents with:  CPX      HPI:   Here for cpx -see below     Current Outpatient Medications:  glimepiride 2 MG Oral Tab Take 1 tablet (2 mg total) by mouth daily with breakfast. Disp: 45 tab fever, no weakness . HEENT/Neck:   Head no dizziness, no lightheadedness. Eyes normal vision, no redness , no drainage will schedule an eye appointment. Ears no earaches, no fullness, normal hearing, no tinnitus.  Nose and Sinuses no recurrent colds, no s depression, hallucinations. Insomnia none/no hx of sleep disorder. Memory loss none.                 EXAM:   /82 (BP Location: Left arm, Patient Position: Sitting, Cuff Size: adult)   Pulse 53   Temp 97.5 °F (36.4 °C) (Oral)   Resp 12   Ht 70.5\"   Wt modalities normal.   LYMPHATICS:   Cervical: none. Groin: no adenopathy . Inguinal: no adenopathy. Supraclavicular: none.    DERMATOLOGY:   Rash: no. But has stasis dermatitis     DIABETIC FOOT EXAM BILATERAL  INSPECTION stasis dermatitis secondary to

## 2018-10-03 ENCOUNTER — TELEPHONE (OUTPATIENT)
Dept: INTERNAL MEDICINE CLINIC | Facility: CLINIC | Age: 63
End: 2018-10-03

## 2018-10-16 DIAGNOSIS — E11.9 TYPE 2 DIABETES MELLITUS WITHOUT COMPLICATION, WITHOUT LONG-TERM CURRENT USE OF INSULIN (HCC): Chronic | ICD-10-CM

## 2018-10-17 RX ORDER — GLIMEPIRIDE 2 MG/1
TABLET ORAL
Qty: 45 TABLET | Refills: 0 | Status: SHIPPED | OUTPATIENT
Start: 2018-10-17 | End: 2018-11-12

## 2018-11-12 DIAGNOSIS — I10 ESSENTIAL HYPERTENSION, BENIGN: Chronic | ICD-10-CM

## 2018-11-12 DIAGNOSIS — E11.9 TYPE 2 DIABETES MELLITUS WITHOUT COMPLICATION, WITHOUT LONG-TERM CURRENT USE OF INSULIN (HCC): Chronic | ICD-10-CM

## 2018-11-13 RX ORDER — AMLODIPINE BESYLATE 10 MG/1
TABLET ORAL
Qty: 90 TABLET | Refills: 0 | Status: SHIPPED | OUTPATIENT
Start: 2018-11-13 | End: 2019-02-07

## 2018-11-13 RX ORDER — GLIMEPIRIDE 2 MG/1
TABLET ORAL
Qty: 45 TABLET | Refills: 2 | Status: SHIPPED | OUTPATIENT
Start: 2018-11-13 | End: 2019-03-16 | Stop reason: DRUGHIGH

## 2018-11-13 NOTE — TELEPHONE ENCOUNTER
Metformin 850 mg 1 tab tid filled 8/13/18 270 with 0 refills     Amlodipine 10 mg 1 tab daily filled 8/21/18 90 with 0 refills     Glimepiride 2 mg 1/2 tab in the AM and 1 tab evening filled 10/17/18 45 with 0 refills     LOv 9/22/18    Return in about 6 m

## 2018-11-16 DIAGNOSIS — I10 ESSENTIAL HYPERTENSION, BENIGN: Chronic | ICD-10-CM

## 2018-11-16 RX ORDER — LISINOPRIL AND HYDROCHLOROTHIAZIDE 20; 12.5 MG/1; MG/1
2 TABLET ORAL DAILY
Qty: 180 TABLET | Refills: 0 | Status: SHIPPED | OUTPATIENT
Start: 2018-11-16 | End: 2019-02-07

## 2018-11-16 NOTE — TELEPHONE ENCOUNTER
Refill requested:   Requested Prescriptions     Pending Prescriptions Disp Refills   • LISINOPRIL-HYDROCHLOROTHIAZIDE 20-12.5 MG Oral Tab [Pharmacy Med Name: LISINOPRIL-HCTZ 20/12.5MG TABLETS] 180 tablet 0     Sig: TAKE 2 TABLETS BY MOUTH DAILY         Pas

## 2018-12-10 RX ORDER — ASPIRIN 81 MG/1
TABLET, COATED ORAL
Qty: 90 TABLET | Refills: 0 | Status: SHIPPED | OUTPATIENT
Start: 2018-12-10 | End: 2019-03-11

## 2018-12-10 NOTE — TELEPHONE ENCOUNTER
Refill requested:   Requested Prescriptions     Pending Prescriptions Disp Refills   • ASPIRIN LOW DOSE 81 MG Oral Tab EC [Pharmacy Med Name: ASPIRIN 81MG EC LOW DOSE  TABLETS] 90 tablet 0     Sig: TAKE 1 TABLET BY MOUTH EVERY DAY     Non protocol medicati

## 2018-12-17 DIAGNOSIS — E11.9 TYPE 2 DIABETES MELLITUS WITHOUT COMPLICATION, WITHOUT LONG-TERM CURRENT USE OF INSULIN (HCC): Chronic | ICD-10-CM

## 2018-12-17 DIAGNOSIS — I10 ESSENTIAL HYPERTENSION, BENIGN: ICD-10-CM

## 2018-12-17 RX ORDER — METOPROLOL SUCCINATE 25 MG/1
TABLET, EXTENDED RELEASE ORAL
Qty: 90 TABLET | Refills: 0 | Status: SHIPPED | OUTPATIENT
Start: 2018-12-17 | End: 2019-03-11

## 2018-12-17 RX ORDER — GLIMEPIRIDE 2 MG/1
TABLET ORAL
Qty: 45 TABLET | Refills: 0 | Status: SHIPPED | OUTPATIENT
Start: 2018-12-17 | End: 2019-03-16 | Stop reason: DRUGHIGH

## 2018-12-17 NOTE — TELEPHONE ENCOUNTER
Refill requested:   Requested Prescriptions     Pending Prescriptions Disp Refills   • GLIMEPIRIDE 2 MG Oral Tab [Pharmacy Med Name: GLIMEPIRIDE 2MG TABLETS] 45 tablet 0     Sig: TAKE 1/2 TABLET BY MOUTH EVERY MORNING THEN TAKE 1 TABLET BY MOUTH EVERY EVEN

## 2019-02-07 DIAGNOSIS — I10 ESSENTIAL HYPERTENSION, BENIGN: Chronic | ICD-10-CM

## 2019-02-07 RX ORDER — LISINOPRIL AND HYDROCHLOROTHIAZIDE 20; 12.5 MG/1; MG/1
2 TABLET ORAL DAILY
Qty: 180 TABLET | Refills: 0 | Status: SHIPPED | OUTPATIENT
Start: 2019-02-07 | End: 2019-05-06

## 2019-02-07 RX ORDER — AMLODIPINE BESYLATE 10 MG/1
TABLET ORAL
Qty: 90 TABLET | Refills: 0 | Status: SHIPPED | OUTPATIENT
Start: 2019-02-07 | End: 2019-05-06

## 2019-02-07 NOTE — TELEPHONE ENCOUNTER
Refill requested:   Requested Prescriptions     Pending Prescriptions Disp Refills   • LISINOPRIL-HYDROCHLOROTHIAZIDE 20-12.5 MG Oral Tab [Pharmacy Med Name: LISINOPRIL-HCTZ 20/12.5MG TABLETS] 180 tablet 0     Sig: TAKE 2 TABLETS BY MOUTH DAILY   • AMLODIP

## 2019-03-11 DIAGNOSIS — I10 ESSENTIAL HYPERTENSION, BENIGN: ICD-10-CM

## 2019-03-11 RX ORDER — ASPIRIN 81 MG/1
TABLET, COATED ORAL
Qty: 90 TABLET | Refills: 0 | Status: SHIPPED | OUTPATIENT
Start: 2019-03-11 | End: 2019-03-16

## 2019-03-11 NOTE — TELEPHONE ENCOUNTER
Protocol passed    Medication(s) to Refill:   Requested Prescriptions     Pending Prescriptions Disp Refills   • METOPROLOL SUCCINATE ER 25 MG Oral Tablet 24 Hr [Pharmacy Med Name: METOPROLOL ER SUCCINATE 25MG TABS] 90 tablet 0     Sig: TAKE 1 TABLET BY MO

## 2019-03-11 NOTE — TELEPHONE ENCOUNTER
Refill requested:   Requested Prescriptions     Pending Prescriptions Disp Refills   • ASPIRIN LOW DOSE 81 MG Oral Tab EC [Pharmacy Med Name: ASPIRIN 81MG EC LOW DOSE  TABLETS] 90 tablet 0     Sig: TAKE 1 TABLET BY MOUTH EVERY DAY       Failed protocol

## 2019-03-12 ENCOUNTER — TELEPHONE (OUTPATIENT)
Dept: INTERNAL MEDICINE CLINIC | Facility: CLINIC | Age: 64
End: 2019-03-12

## 2019-03-12 DIAGNOSIS — E11.9 TYPE 2 DIABETES MELLITUS WITHOUT COMPLICATION, WITHOUT LONG-TERM CURRENT USE OF INSULIN (HCC): Primary | Chronic | ICD-10-CM

## 2019-03-12 DIAGNOSIS — I10 ESSENTIAL HYPERTENSION, BENIGN: Chronic | ICD-10-CM

## 2019-03-12 DIAGNOSIS — E78.00 HYPERCHOLESTEREMIA: Chronic | ICD-10-CM

## 2019-03-13 ENCOUNTER — APPOINTMENT (OUTPATIENT)
Dept: LAB | Age: 64
End: 2019-03-13
Attending: INTERNAL MEDICINE
Payer: COMMERCIAL

## 2019-03-13 DIAGNOSIS — I10 ESSENTIAL HYPERTENSION, BENIGN: Chronic | ICD-10-CM

## 2019-03-13 DIAGNOSIS — E78.00 HYPERCHOLESTEREMIA: Chronic | ICD-10-CM

## 2019-03-13 DIAGNOSIS — E11.9 TYPE 2 DIABETES MELLITUS WITHOUT COMPLICATION, WITHOUT LONG-TERM CURRENT USE OF INSULIN (HCC): Chronic | ICD-10-CM

## 2019-03-13 LAB
ALBUMIN SERPL-MCNC: 3.6 G/DL (ref 3.4–5)
ALBUMIN/GLOB SERPL: 0.9 {RATIO} (ref 1–2)
ALP LIVER SERPL-CCNC: 56 U/L (ref 45–117)
ALT SERPL-CCNC: 18 U/L (ref 16–61)
ANION GAP SERPL CALC-SCNC: 6 MMOL/L (ref 0–18)
AST SERPL-CCNC: 14 U/L (ref 15–37)
BILIRUB SERPL-MCNC: 0.7 MG/DL (ref 0.1–2)
BUN BLD-MCNC: 16 MG/DL (ref 7–18)
BUN/CREAT SERPL: 17.4 (ref 10–20)
CALCIUM BLD-MCNC: 9.2 MG/DL (ref 8.5–10.1)
CHLORIDE SERPL-SCNC: 100 MMOL/L (ref 98–107)
CHOLEST SMN-MCNC: 194 MG/DL (ref ?–200)
CO2 SERPL-SCNC: 30 MMOL/L (ref 21–32)
CREAT BLD-MCNC: 0.92 MG/DL (ref 0.7–1.3)
EST. AVERAGE GLUCOSE BLD GHB EST-MCNC: 128 MG/DL (ref 68–126)
GLOBULIN PLAS-MCNC: 4.2 G/DL (ref 2.8–4.4)
GLUCOSE BLD-MCNC: 117 MG/DL (ref 70–99)
HBA1C MFR BLD HPLC: 6.1 % (ref ?–5.7)
HDLC SERPL-MCNC: 52 MG/DL (ref 40–59)
LDLC SERPL CALC-MCNC: 120 MG/DL (ref ?–100)
M PROTEIN MFR SERPL ELPH: 7.8 G/DL (ref 6.4–8.2)
NONHDLC SERPL-MCNC: 142 MG/DL (ref ?–130)
OSMOLALITY SERPL CALC.SUM OF ELEC: 284 MOSM/KG (ref 275–295)
POTASSIUM SERPL-SCNC: 4.2 MMOL/L (ref 3.5–5.1)
SODIUM SERPL-SCNC: 136 MMOL/L (ref 136–145)
TRIGL SERPL-MCNC: 112 MG/DL (ref 30–149)
VLDLC SERPL CALC-MCNC: 22 MG/DL (ref 0–30)

## 2019-03-13 PROCEDURE — 80053 COMPREHEN METABOLIC PANEL: CPT | Performed by: INTERNAL MEDICINE

## 2019-03-13 PROCEDURE — 80061 LIPID PANEL: CPT | Performed by: INTERNAL MEDICINE

## 2019-03-13 PROCEDURE — 36415 COLL VENOUS BLD VENIPUNCTURE: CPT | Performed by: INTERNAL MEDICINE

## 2019-03-13 PROCEDURE — 83036 HEMOGLOBIN GLYCOSYLATED A1C: CPT | Performed by: INTERNAL MEDICINE

## 2019-03-14 RX ORDER — METOPROLOL SUCCINATE 25 MG/1
TABLET, EXTENDED RELEASE ORAL
Qty: 90 TABLET | Refills: 0 | Status: SHIPPED | OUTPATIENT
Start: 2019-03-14 | End: 2019-06-07

## 2019-03-14 NOTE — TELEPHONE ENCOUNTER
Future Appointments   Date Time Provider Dmitri Veronica   3/16/2019 12:45 PM Vi Dawson MD [facility] [place]     Patient has scheduled appointment - Rx has been filled

## 2019-03-16 ENCOUNTER — OFFICE VISIT (OUTPATIENT)
Dept: INTERNAL MEDICINE CLINIC | Facility: CLINIC | Age: 64
End: 2019-03-16
Payer: COMMERCIAL

## 2019-03-16 VITALS
BODY MASS INDEX: 38.93 KG/M2 | WEIGHT: 275 LBS | HEART RATE: 64 BPM | DIASTOLIC BLOOD PRESSURE: 68 MMHG | TEMPERATURE: 98 F | SYSTOLIC BLOOD PRESSURE: 120 MMHG | HEIGHT: 70.5 IN | RESPIRATION RATE: 16 BRPM

## 2019-03-16 DIAGNOSIS — E78.00 HYPERCHOLESTEREMIA: Primary | ICD-10-CM

## 2019-03-16 DIAGNOSIS — E11.9 TYPE 2 DIABETES MELLITUS WITHOUT COMPLICATION, WITHOUT LONG-TERM CURRENT USE OF INSULIN (HCC): Chronic | ICD-10-CM

## 2019-03-16 PROCEDURE — 99213 OFFICE O/P EST LOW 20 MIN: CPT | Performed by: INTERNAL MEDICINE

## 2019-03-16 RX ORDER — PRAVASTATIN SODIUM 20 MG
20 TABLET ORAL NIGHTLY
Qty: 90 TABLET | Refills: 1 | Status: SHIPPED | OUTPATIENT
Start: 2019-03-16 | End: 2019-09-04

## 2019-03-16 RX ORDER — GLIMEPIRIDE 2 MG/1
2 TABLET ORAL
Qty: 90 TABLET | Refills: 1 | Status: SHIPPED | OUTPATIENT
Start: 2019-03-16 | End: 2019-09-04

## 2019-03-16 NOTE — PROGRESS NOTES
Marianela Estevez  10/31/1955 is a 61year old male. Patient presents with:  Medication Follow-Up    bp ck    HPI:   DIABETES MELLITUS:   The diet that the patient has been following is the American Diabetes Association diet.    The frequency of the m as uncontrolled    • Unspecified essential hypertension    • Varicose vein    • Visual impairment     reading glasses      Social History:  Social History    Tobacco Use      Smoking status: Current Some Day Smoker        Packs/day: 0.50        Years: 36. 0 total) by mouth daily with breakfast.  -     HEMOGLOBIN A1C; Future    Other orders  -     Cancel: OPTOMETRY - INTERNAL       Labs discussed with patient   .   Blood work in 4 months        Patient Instructions   Continue present management     The patient

## 2019-05-06 DIAGNOSIS — I10 ESSENTIAL HYPERTENSION, BENIGN: Chronic | ICD-10-CM

## 2019-05-07 RX ORDER — AMLODIPINE BESYLATE 10 MG/1
TABLET ORAL
Qty: 90 TABLET | Refills: 0 | Status: SHIPPED | OUTPATIENT
Start: 2019-05-07 | End: 2019-07-31

## 2019-05-07 RX ORDER — LISINOPRIL AND HYDROCHLOROTHIAZIDE 20; 12.5 MG/1; MG/1
2 TABLET ORAL DAILY
Qty: 180 TABLET | Refills: 0 | Status: SHIPPED | OUTPATIENT
Start: 2019-05-07 | End: 2019-07-31

## 2019-05-07 NOTE — TELEPHONE ENCOUNTER
Passed protocol    Medication(s) to Refill:   Requested Prescriptions     Pending Prescriptions Disp Refills   • AMLODIPINE BESYLATE 10 MG Oral Tab [Pharmacy Med Name: AMLODIPINE BESYLATE 10MG TABLETS] 90 tablet 0     Sig: TAKE 1 TABLET(10 MG) BY MOUTH MEKA

## 2019-06-07 DIAGNOSIS — I10 ESSENTIAL HYPERTENSION, BENIGN: ICD-10-CM

## 2019-06-07 RX ORDER — ASPIRIN 81 MG/1
TABLET, COATED ORAL
Qty: 90 TABLET | Refills: 0 | Status: SHIPPED | OUTPATIENT
Start: 2019-06-07 | End: 2019-09-04

## 2019-06-07 RX ORDER — METOPROLOL SUCCINATE 25 MG/1
TABLET, EXTENDED RELEASE ORAL
Qty: 90 TABLET | Refills: 0 | Status: SHIPPED | OUTPATIENT
Start: 2019-06-07 | End: 2019-09-04

## 2019-06-07 NOTE — TELEPHONE ENCOUNTER
Last OV: 3/16/19 with Dr. Mishel Cuadra  Last refill date: March 2019     #/refills: #90, 0 refills  When pt was asked to return for OV: 6 months  Upcoming appt/reason: no upcoming appt  Last labs 3/13/19

## 2019-07-16 ENCOUNTER — APPOINTMENT (OUTPATIENT)
Dept: LAB | Age: 64
End: 2019-07-16
Attending: INTERNAL MEDICINE
Payer: COMMERCIAL

## 2019-07-16 DIAGNOSIS — E78.00 HYPERCHOLESTEREMIA: ICD-10-CM

## 2019-07-16 DIAGNOSIS — E11.9 TYPE 2 DIABETES MELLITUS WITHOUT COMPLICATION, WITHOUT LONG-TERM CURRENT USE OF INSULIN (HCC): Chronic | ICD-10-CM

## 2019-07-16 LAB
ALBUMIN SERPL-MCNC: 3.5 G/DL (ref 3.4–5)
ALP LIVER SERPL-CCNC: 55 U/L (ref 45–117)
ALT SERPL-CCNC: 20 U/L (ref 16–61)
AST SERPL-CCNC: 11 U/L (ref 15–37)
BILIRUB DIRECT SERPL-MCNC: 0.1 MG/DL (ref 0–0.2)
BILIRUB SERPL-MCNC: 0.4 MG/DL (ref 0.1–2)
CHOLEST SMN-MCNC: 138 MG/DL (ref ?–200)
EST. AVERAGE GLUCOSE BLD GHB EST-MCNC: 131 MG/DL (ref 68–126)
HBA1C MFR BLD HPLC: 6.2 % (ref ?–5.7)
HDLC SERPL-MCNC: 54 MG/DL (ref 40–59)
LDLC SERPL CALC-MCNC: 69 MG/DL (ref ?–100)
M PROTEIN MFR SERPL ELPH: 8 G/DL (ref 6.4–8.2)
NONHDLC SERPL-MCNC: 84 MG/DL (ref ?–130)
TRIGL SERPL-MCNC: 76 MG/DL (ref 30–149)
VLDLC SERPL CALC-MCNC: 15 MG/DL (ref 0–30)

## 2019-07-16 PROCEDURE — 80061 LIPID PANEL: CPT | Performed by: INTERNAL MEDICINE

## 2019-07-16 PROCEDURE — 83036 HEMOGLOBIN GLYCOSYLATED A1C: CPT | Performed by: INTERNAL MEDICINE

## 2019-07-16 PROCEDURE — 80076 HEPATIC FUNCTION PANEL: CPT | Performed by: INTERNAL MEDICINE

## 2019-07-16 PROCEDURE — 36415 COLL VENOUS BLD VENIPUNCTURE: CPT | Performed by: INTERNAL MEDICINE

## 2019-07-31 DIAGNOSIS — I10 ESSENTIAL HYPERTENSION, BENIGN: Chronic | ICD-10-CM

## 2019-08-01 RX ORDER — AMLODIPINE BESYLATE 10 MG/1
TABLET ORAL
Qty: 90 TABLET | Refills: 0 | Status: SHIPPED | OUTPATIENT
Start: 2019-08-01 | End: 2019-10-28

## 2019-08-01 RX ORDER — LISINOPRIL AND HYDROCHLOROTHIAZIDE 20; 12.5 MG/1; MG/1
2 TABLET ORAL DAILY
Qty: 180 TABLET | Refills: 0 | Status: SHIPPED | OUTPATIENT
Start: 2019-08-01 | End: 2019-10-28

## 2019-08-01 NOTE — TELEPHONE ENCOUNTER
Passed protocol     Last refill:  5/7/2019 Amlodipine 10 mg #90 NR  5/7/2019 Lisinopril HCTZ #180 NR  11/13/2018 Metformin 850 mg #270 2R    Last A1C - 7/16/2019     LOV:   3/16/2019 Dr Ariadna Seaman RTC 6 months     No FOV schedule

## 2019-08-13 ENCOUNTER — TELEPHONE (OUTPATIENT)
Dept: INTERNAL MEDICINE CLINIC | Facility: CLINIC | Age: 64
End: 2019-08-13

## 2019-09-04 ENCOUNTER — TELEPHONE (OUTPATIENT)
Dept: INTERNAL MEDICINE CLINIC | Facility: CLINIC | Age: 64
End: 2019-09-04

## 2019-09-04 DIAGNOSIS — I10 ESSENTIAL HYPERTENSION, BENIGN: ICD-10-CM

## 2019-09-04 DIAGNOSIS — Z00.00 LABORATORY EXAMINATION ORDERED AS PART OF A ROUTINE GENERAL MEDICAL EXAMINATION: Primary | ICD-10-CM

## 2019-09-04 DIAGNOSIS — E11.9 TYPE 2 DIABETES MELLITUS WITHOUT COMPLICATION, WITHOUT LONG-TERM CURRENT USE OF INSULIN (HCC): Chronic | ICD-10-CM

## 2019-09-04 DIAGNOSIS — E78.00 HYPERCHOLESTEREMIA: ICD-10-CM

## 2019-09-04 DIAGNOSIS — Z12.5 SCREENING FOR MALIGNANT NEOPLASM OF PROSTATE: ICD-10-CM

## 2019-09-04 RX ORDER — METOPROLOL SUCCINATE 25 MG/1
TABLET, EXTENDED RELEASE ORAL
Qty: 90 TABLET | Refills: 0 | Status: SHIPPED | OUTPATIENT
Start: 2019-09-04 | End: 2019-11-18

## 2019-09-04 NOTE — TELEPHONE ENCOUNTER
Patient has an appointment on 10/15/19 and was wondering if he will be due for any labs, and if so if Dr Andreas Acosta can place the orders for him. Please call pt with order status.

## 2019-09-04 NOTE — TELEPHONE ENCOUNTER
Metoprolol Succinate ER 25 Mg Oral Tablet 24 Hr     Passed Protocol    Last OV relevant to medication: 3/16/2019    Last refill date: 6/7/2019     #/refills: #90 w/ 0 refills     When pt was asked to return for OV: 6 months     Upcoming appt/reason: No fut

## 2019-09-04 NOTE — TELEPHONE ENCOUNTER
Asprin Low Dose 81 mg Oral Tab EC     Last OV relevant to medication: 3/16/2019    Last refill date: 6/7/2019     #/refills: #90 w/ 0 refills     When pt was asked to return for OV: 6 months     Upcoming appt/reason: 11/18/2019

## 2019-09-04 NOTE — TELEPHONE ENCOUNTER
10/15/19 will be too early for pt to have labs checked as last lab work done on 7/16/19. Per result note on 7/17/19 provider advised for pt to have cpx in 4 months (November). Pt notified of above and requested to change to November. OV scheduled.    Fu

## 2019-09-05 DIAGNOSIS — E78.00 HYPERCHOLESTEREMIA: ICD-10-CM

## 2019-09-05 RX ORDER — ASPIRIN 81 MG/1
TABLET, COATED ORAL
Qty: 90 TABLET | Refills: 0 | Status: SHIPPED | OUTPATIENT
Start: 2019-09-05 | End: 2019-11-18

## 2019-09-05 RX ORDER — METOPROLOL SUCCINATE 25 MG/1
TABLET, EXTENDED RELEASE ORAL
Qty: 90 TABLET | Refills: 0 | OUTPATIENT
Start: 2019-09-05

## 2019-09-05 RX ORDER — AMLODIPINE BESYLATE 10 MG/1
TABLET ORAL
Qty: 90 TABLET | Refills: 0 | OUTPATIENT
Start: 2019-09-05

## 2019-09-05 RX ORDER — PRAVASTATIN SODIUM 20 MG
TABLET ORAL
Qty: 90 TABLET | Refills: 0 | OUTPATIENT
Start: 2019-09-05

## 2019-09-05 RX ORDER — PRAVASTATIN SODIUM 20 MG
TABLET ORAL
Qty: 90 TABLET | Refills: 0 | Status: SHIPPED | OUTPATIENT
Start: 2019-09-05 | End: 2019-11-18

## 2019-09-05 RX ORDER — GLIMEPIRIDE 2 MG/1
TABLET ORAL
Qty: 90 TABLET | Refills: 0 | Status: SHIPPED | OUTPATIENT
Start: 2019-09-05 | End: 2019-11-18

## 2019-09-05 NOTE — TELEPHONE ENCOUNTER
Aspirin, Metoprolol, Amlodipine - Refused due to being requested too soon - Rx's filled on 9/4/19 and 8/1/19 all with 90 day supplies.      Medication(s) to Refill:   Requested Prescriptions     Pending Prescriptions Disp Refills   • PRAVASTATIN SODIUM 20 M

## 2019-10-28 DIAGNOSIS — I10 ESSENTIAL HYPERTENSION, BENIGN: Chronic | ICD-10-CM

## 2019-10-28 RX ORDER — AMLODIPINE BESYLATE 10 MG/1
TABLET ORAL
Qty: 90 TABLET | Refills: 0 | Status: SHIPPED | OUTPATIENT
Start: 2019-10-28 | End: 2020-01-27

## 2019-10-28 RX ORDER — LISINOPRIL AND HYDROCHLOROTHIAZIDE 20; 12.5 MG/1; MG/1
2 TABLET ORAL DAILY
Qty: 180 TABLET | Refills: 0 | Status: SHIPPED | OUTPATIENT
Start: 2019-10-28 | End: 2020-01-28

## 2019-10-28 NOTE — TELEPHONE ENCOUNTER
Metformin 850 mg 1 tab tid filled 8/1/19 270 with 0 refills   Lisinopril hctz 20-12.5 mg 2 tab daily filled 8/1/19 180 with 0 refills   Amlodipine 10 mg 1 tab daily filled 8/1/19 90 with 0 refill     LOV 3/16/19  Return in about 6 months (around 9/16/2019)

## 2019-11-13 ENCOUNTER — LAB ENCOUNTER (OUTPATIENT)
Dept: LAB | Age: 64
End: 2019-11-13
Attending: INTERNAL MEDICINE
Payer: COMMERCIAL

## 2019-11-13 DIAGNOSIS — Z00.00 LABORATORY EXAMINATION ORDERED AS PART OF A ROUTINE GENERAL MEDICAL EXAMINATION: ICD-10-CM

## 2019-11-13 DIAGNOSIS — E11.9 TYPE 2 DIABETES MELLITUS WITHOUT COMPLICATION, WITHOUT LONG-TERM CURRENT USE OF INSULIN (HCC): Chronic | ICD-10-CM

## 2019-11-13 DIAGNOSIS — Z12.5 SCREENING FOR MALIGNANT NEOPLASM OF PROSTATE: ICD-10-CM

## 2019-11-13 PROCEDURE — 85025 COMPLETE CBC W/AUTO DIFF WBC: CPT | Performed by: INTERNAL MEDICINE

## 2019-11-13 PROCEDURE — 83036 HEMOGLOBIN GLYCOSYLATED A1C: CPT | Performed by: INTERNAL MEDICINE

## 2019-11-13 PROCEDURE — 84436 ASSAY OF TOTAL THYROXINE: CPT | Performed by: INTERNAL MEDICINE

## 2019-11-13 PROCEDURE — 80061 LIPID PANEL: CPT | Performed by: INTERNAL MEDICINE

## 2019-11-13 PROCEDURE — 82043 UR ALBUMIN QUANTITATIVE: CPT | Performed by: INTERNAL MEDICINE

## 2019-11-13 PROCEDURE — 84153 ASSAY OF PSA TOTAL: CPT | Performed by: INTERNAL MEDICINE

## 2019-11-13 PROCEDURE — 82570 ASSAY OF URINE CREATININE: CPT | Performed by: INTERNAL MEDICINE

## 2019-11-13 PROCEDURE — 36415 COLL VENOUS BLD VENIPUNCTURE: CPT | Performed by: INTERNAL MEDICINE

## 2019-11-13 PROCEDURE — 80053 COMPREHEN METABOLIC PANEL: CPT | Performed by: INTERNAL MEDICINE

## 2019-11-18 ENCOUNTER — OFFICE VISIT (OUTPATIENT)
Dept: INTERNAL MEDICINE CLINIC | Facility: CLINIC | Age: 64
End: 2019-11-18
Payer: COMMERCIAL

## 2019-11-18 VITALS
RESPIRATION RATE: 16 BRPM | SYSTOLIC BLOOD PRESSURE: 122 MMHG | BODY MASS INDEX: 37.73 KG/M2 | HEART RATE: 66 BPM | WEIGHT: 266.5 LBS | DIASTOLIC BLOOD PRESSURE: 66 MMHG | TEMPERATURE: 98 F | HEIGHT: 70.5 IN | OXYGEN SATURATION: 99 %

## 2019-11-18 DIAGNOSIS — I10 ESSENTIAL HYPERTENSION, BENIGN: Primary | Chronic | ICD-10-CM

## 2019-11-18 DIAGNOSIS — E78.00 HYPERCHOLESTEREMIA: ICD-10-CM

## 2019-11-18 DIAGNOSIS — E11.9 TYPE 2 DIABETES MELLITUS WITHOUT COMPLICATION, WITHOUT LONG-TERM CURRENT USE OF INSULIN (HCC): Chronic | ICD-10-CM

## 2019-11-18 DIAGNOSIS — Z00.00 ROUTINE GENERAL MEDICAL EXAMINATION AT A HEALTH CARE FACILITY: ICD-10-CM

## 2019-11-18 PROCEDURE — 99396 PREV VISIT EST AGE 40-64: CPT | Performed by: INTERNAL MEDICINE

## 2019-11-18 PROCEDURE — 99213 OFFICE O/P EST LOW 20 MIN: CPT | Performed by: INTERNAL MEDICINE

## 2019-11-18 PROCEDURE — 93000 ELECTROCARDIOGRAM COMPLETE: CPT | Performed by: INTERNAL MEDICINE

## 2019-11-18 RX ORDER — PRAVASTATIN SODIUM 20 MG
TABLET ORAL
Qty: 90 TABLET | Refills: 1 | Status: SHIPPED | OUTPATIENT
Start: 2019-11-18 | End: 2020-08-06

## 2019-11-18 RX ORDER — ASPIRIN 81 MG/1
81 TABLET ORAL
Qty: 90 TABLET | Refills: 1 | Status: SHIPPED | OUTPATIENT
Start: 2019-11-18 | End: 2020-08-06

## 2019-11-18 RX ORDER — METOPROLOL SUCCINATE 25 MG/1
25 TABLET, EXTENDED RELEASE ORAL
Qty: 90 TABLET | Refills: 1 | Status: SHIPPED | OUTPATIENT
Start: 2019-11-18 | End: 2020-05-12

## 2019-11-18 RX ORDER — GLIMEPIRIDE 2 MG/1
TABLET ORAL
Qty: 90 TABLET | Refills: 1 | Status: SHIPPED | OUTPATIENT
Start: 2019-11-18 | End: 2020-05-12

## 2019-11-18 NOTE — PATIENT INSTRUCTIONS
Given 3 options of statin. #1 continue if the symptoms are tolerable. #2 discontinue medicine give me a call in about 6 weeks  #3 May introduce Zetia.   For the moment the patient will hold the medicine for 6 weeks

## 2019-11-18 NOTE — PROGRESS NOTES
Randee Estevez  10/31/1955 is a 59year old male.     Patient presents with:  Physical      HPI:   Here for cpx -see below   Current Outpatient Medications   Medication Sig Dispense Refill   • Pravastatin Sodium 20 MG Oral Tab TAKE 1 TABLET(20 MG) BY tolerance, good general state of health, no fatigue, no fever, no weakness . HEENT/Neck:   Head no dizziness, no lightheadedness. Eyes normal vision, no redness , no drainage will schedule an eye appointment.  Ears no earaches, no fullness, normal hearing consciousness, memory loss, seizures, paresthesias, weakness. Tingling/numbness none. Trouble with balance none. Psychiatric:   Patient denies anxiety, depression, hallucinations. Insomnia none/no hx of sleep disorder. Memory loss none.                 EX Power,tone,co-ordination normalInvoluntary movements and wasting none. Reflexes: normal.   Sensory: all sensory modalities normal.   LYMPHATICS:   Cervical: none. Groin: no adenopathy . Inguinal: no adenopathy. Supraclavicular: none.    DERMATOLOGY:

## 2019-11-22 RX ORDER — ASPIRIN 81 MG/1
TABLET, COATED ORAL
Qty: 90 TABLET | Refills: 0 | Status: SHIPPED | OUTPATIENT
Start: 2019-11-22 | End: 2020-02-19

## 2019-11-22 NOTE — TELEPHONE ENCOUNTER
ASPIRIN LOW DOSE 81 MG Oral Tab EC    Last OV relevant to medication:  11-     Last refill date: 11- # 90 tabs with 1 refills     When pt was asked to return for OV: 6 months     Upcoming appt/reason: none     Labs: 11-

## 2020-01-24 DIAGNOSIS — I10 ESSENTIAL HYPERTENSION, BENIGN: Chronic | ICD-10-CM

## 2020-01-25 ENCOUNTER — PATIENT MESSAGE (OUTPATIENT)
Dept: INTERNAL MEDICINE CLINIC | Facility: CLINIC | Age: 65
End: 2020-01-25

## 2020-01-25 DIAGNOSIS — E78.00 HYPERCHOLESTEREMIA: Primary | Chronic | ICD-10-CM

## 2020-01-26 NOTE — TELEPHONE ENCOUNTER
From: Debbie Estevez  To: Grecia Cantu MD  Sent: 1/25/2020 1:27 PM CST  Subject: Visit Follow-up Question    From Varinder Kaminski to Dr. Ethyl Okolona Doc, in response to the last visit and holding the statins for six weeks.  What I have been doing inste

## 2020-01-26 NOTE — TELEPHONE ENCOUNTER
Per last ov w Dr Nichols Backbone from 11/18/19:    Jason Gutierrez 3 options of statin. #1 continue if the symptoms are tolerable. #2 discontinue medicine give me a call in about 6 weeks  #3 May introduce Zetia.   For the moment the patient will hold the medicine for 6 w

## 2020-01-27 DIAGNOSIS — I10 ESSENTIAL HYPERTENSION, BENIGN: Chronic | ICD-10-CM

## 2020-01-27 RX ORDER — AMLODIPINE BESYLATE 10 MG/1
TABLET ORAL
Qty: 90 TABLET | Refills: 0 | Status: SHIPPED | OUTPATIENT
Start: 2020-01-27 | End: 2020-04-25

## 2020-01-28 RX ORDER — LISINOPRIL AND HYDROCHLOROTHIAZIDE 20; 12.5 MG/1; MG/1
2 TABLET ORAL DAILY
Qty: 180 TABLET | Refills: 0 | Status: SHIPPED | OUTPATIENT
Start: 2020-01-28 | End: 2020-04-25

## 2020-01-28 NOTE — TELEPHONE ENCOUNTER
Passed protocol    Requesting LISINOPRIL-HYDROCHLOROTHIAZIDE 20-12.5 MG Oral Tab  LOV: 11/18/19  RTC: 6 months  Last Relevant Labs: 11/13/19  Filled: 10/28/19 #180 with 0 refills    No future appointments.

## 2020-01-29 ENCOUNTER — TELEPHONE (OUTPATIENT)
Dept: INTERNAL MEDICINE CLINIC | Facility: CLINIC | Age: 65
End: 2020-01-29

## 2020-01-29 NOTE — TELEPHONE ENCOUNTER
Collaboration form received from [employer] requesting pts most recent ov note documenting treatment of DM, as well as most recent labs and medication list.  All requested information printed and placed with request form in providers in box for approval to send

## 2020-01-31 NOTE — TELEPHONE ENCOUNTER
Provider approved sending requested information to [employer] - information was sent to [phone] - fax confirmation received.

## 2020-02-19 RX ORDER — ASPIRIN 81 MG/1
TABLET, COATED ORAL
Qty: 90 TABLET | Refills: 0 | Status: SHIPPED | OUTPATIENT
Start: 2020-02-19 | End: 2020-05-12

## 2020-02-19 NOTE — TELEPHONE ENCOUNTER
Passed protocol for Metformin    Requesting METFORMIN  MG Oral Tab  LOV: 11/18/19  RTC: 6 months  Last Relevant Labs: 11/13/19  Filled: 10/28/19 #270 with 0 refills      Requesting aspirin (ASPIRIN LOW DOSE) 81 MG Oral Tab EC  Filled: 11/18/19 #90 w

## 2020-04-08 ENCOUNTER — PATIENT MESSAGE (OUTPATIENT)
Dept: INTERNAL MEDICINE CLINIC | Facility: CLINIC | Age: 65
End: 2020-04-08

## 2020-04-08 NOTE — TELEPHONE ENCOUNTER
From: Rosalina Estevez  To: Sharri Beebe MD  Sent: 4/8/2020 10:47 AM CDT  Subject: Other    Dr. Alvarado Loud  In reference to my lipid panel lab work that is scheduled. I have not forgot about this.    I have been delaying it due to the current virus situatio

## 2020-04-08 NOTE — TELEPHONE ENCOUNTER
Pts instructions at last OV on 11/18/19 and from [employer] message 1/25/20.  Please advise.      Given 3 options of statin.  #1 continue if the symptoms are tolerable.  #2 discontinue medicine give me a call in about 6 weeks  #3 May introduce Zetia.  For the

## 2020-04-25 DIAGNOSIS — I10 ESSENTIAL HYPERTENSION, BENIGN: Chronic | ICD-10-CM

## 2020-04-27 RX ORDER — LISINOPRIL AND HYDROCHLOROTHIAZIDE 20; 12.5 MG/1; MG/1
2 TABLET ORAL DAILY
Qty: 180 TABLET | Refills: 0 | Status: SHIPPED | OUTPATIENT
Start: 2020-04-27 | End: 2020-07-17

## 2020-04-27 RX ORDER — AMLODIPINE BESYLATE 10 MG/1
10 TABLET ORAL DAILY
Qty: 90 TABLET | Refills: 0 | Status: SHIPPED | OUTPATIENT
Start: 2020-04-27 | End: 2020-08-06

## 2020-05-12 DIAGNOSIS — E11.9 TYPE 2 DIABETES MELLITUS WITHOUT COMPLICATION, WITHOUT LONG-TERM CURRENT USE OF INSULIN (HCC): Chronic | ICD-10-CM

## 2020-05-12 DIAGNOSIS — I10 ESSENTIAL HYPERTENSION, BENIGN: Chronic | ICD-10-CM

## 2020-05-12 RX ORDER — ASPIRIN 81 MG/1
81 TABLET ORAL DAILY
Qty: 90 TABLET | Refills: 0 | Status: SHIPPED | OUTPATIENT
Start: 2020-05-12 | End: 2021-10-29 | Stop reason: ALTCHOICE

## 2020-05-12 RX ORDER — METOPROLOL SUCCINATE 25 MG/1
25 TABLET, EXTENDED RELEASE ORAL
Qty: 90 TABLET | Refills: 1 | Status: SHIPPED | OUTPATIENT
Start: 2020-05-12 | End: 2020-08-06

## 2020-05-12 RX ORDER — GLIMEPIRIDE 2 MG/1
TABLET ORAL
Qty: 90 TABLET | Refills: 1 | Status: SHIPPED | OUTPATIENT
Start: 2020-05-12 | End: 2020-08-06

## 2020-05-12 NOTE — TELEPHONE ENCOUNTER
glimepiride 2 MG Oral Tab   Diabetic Medication Protocol Failed5/12 12:47 PM   HgBA1C procedure resulted in past 6 months    Last HgBA1C < 7.5   Filled: 11/18/19 #90, 1 refill   Metoprolol Succinate ER 25 MG Oral Tablet 24 Hr   Protocol passed   Filled: 11

## 2020-07-15 ENCOUNTER — APPOINTMENT (OUTPATIENT)
Dept: LAB | Age: 65
End: 2020-07-15
Attending: INTERNAL MEDICINE
Payer: COMMERCIAL

## 2020-07-15 DIAGNOSIS — E78.00 HYPERCHOLESTEREMIA: Chronic | ICD-10-CM

## 2020-07-15 LAB
CHOLEST SMN-MCNC: 181 MG/DL (ref ?–200)
HDLC SERPL-MCNC: 50 MG/DL (ref 40–59)
LDLC SERPL CALC-MCNC: 114 MG/DL (ref ?–100)
NONHDLC SERPL-MCNC: 131 MG/DL (ref ?–130)
PATIENT FASTING Y/N/NP: YES
TRIGL SERPL-MCNC: 84 MG/DL (ref 30–149)
VLDLC SERPL CALC-MCNC: 17 MG/DL (ref 0–30)

## 2020-07-15 PROCEDURE — 36415 COLL VENOUS BLD VENIPUNCTURE: CPT | Performed by: INTERNAL MEDICINE

## 2020-07-15 PROCEDURE — 80061 LIPID PANEL: CPT | Performed by: INTERNAL MEDICINE

## 2020-07-16 ENCOUNTER — PATIENT MESSAGE (OUTPATIENT)
Dept: INTERNAL MEDICINE CLINIC | Facility: CLINIC | Age: 65
End: 2020-07-16

## 2020-07-16 DIAGNOSIS — I10 ESSENTIAL HYPERTENSION, BENIGN: Chronic | ICD-10-CM

## 2020-07-16 DIAGNOSIS — E78.00 HYPERCHOLESTEREMIA: Primary | Chronic | ICD-10-CM

## 2020-07-16 DIAGNOSIS — E78.00 ELEVATED LDL CHOLESTEROL LEVEL: ICD-10-CM

## 2020-07-16 DIAGNOSIS — Z00.00 LABORATORY EXAMINATION ORDERED AS PART OF A ROUTINE GENERAL MEDICAL EXAMINATION: ICD-10-CM

## 2020-07-16 DIAGNOSIS — Z12.5 SCREENING FOR MALIGNANT NEOPLASM OF PROSTATE: ICD-10-CM

## 2020-07-16 DIAGNOSIS — E11.9 TYPE 2 DIABETES MELLITUS WITHOUT COMPLICATION, WITHOUT LONG-TERM CURRENT USE OF INSULIN (HCC): Chronic | ICD-10-CM

## 2020-07-16 DIAGNOSIS — Z13.29 SCREENING FOR THYROID DISORDER: ICD-10-CM

## 2020-07-16 NOTE — TELEPHONE ENCOUNTER
Tawanna Peoples MD  7/16/2020  9:41 AM      Reviewed results   LDL has worked its way up. Armen Hein been missing his medicines. ?    Would like to repeat a lipid and hemoglobin A1c in 3 months along with all his other labs for CPX      Pt notified of results and

## 2020-07-16 NOTE — TELEPHONE ENCOUNTER
Juju requesting refill for: amLODIPine Besylate 10 MG Oral Tab    Lisinopril-hydroCHLOROthiazide 20-12.5 MG Oral Tab    [id number] [place] [address] [address] AT [address] [place] [address] Rd OF [address], [phone], [id number]

## 2020-07-17 RX ORDER — LISINOPRIL AND HYDROCHLOROTHIAZIDE 20; 12.5 MG/1; MG/1
2 TABLET ORAL DAILY
Qty: 180 TABLET | Refills: 0 | Status: SHIPPED | OUTPATIENT
Start: 2020-07-17 | End: 2020-08-13

## 2020-07-17 NOTE — TELEPHONE ENCOUNTER
Refill requested:   Requested Prescriptions     Pending Prescriptions Disp Refills   • Lisinopril-hydroCHLOROthiazide 20-12.5 MG Oral Tab 180 tablet 0     Sig: Take 2 tablets by mouth daily.        Last refill:  4- # 180 tabs with 0 refills     Labs:

## 2020-08-06 DIAGNOSIS — E78.00 HYPERCHOLESTEREMIA: ICD-10-CM

## 2020-08-06 DIAGNOSIS — E11.9 TYPE 2 DIABETES MELLITUS WITHOUT COMPLICATION, WITHOUT LONG-TERM CURRENT USE OF INSULIN (HCC): Chronic | ICD-10-CM

## 2020-08-06 DIAGNOSIS — I10 ESSENTIAL HYPERTENSION, BENIGN: Chronic | ICD-10-CM

## 2020-08-06 RX ORDER — ASPIRIN 81 MG/1
81 TABLET ORAL DAILY
Qty: 90 TABLET | Refills: 0 | Status: CANCELLED | OUTPATIENT
Start: 2020-08-06

## 2020-08-07 RX ORDER — ASPIRIN 81 MG/1
81 TABLET ORAL
Qty: 90 TABLET | Refills: 1 | Status: SHIPPED | OUTPATIENT
Start: 2020-08-07

## 2020-08-07 RX ORDER — AMLODIPINE BESYLATE 10 MG/1
10 TABLET ORAL DAILY
Qty: 90 TABLET | Refills: 0 | Status: SHIPPED | OUTPATIENT
Start: 2020-08-07 | End: 2020-08-13

## 2020-08-07 RX ORDER — MOMETASONE FUROATE 1 MG/G
1 CREAM TOPICAL 2 TIMES DAILY PRN
Qty: 60 G | Refills: 3 | Status: SHIPPED | OUTPATIENT
Start: 2020-08-07

## 2020-08-07 RX ORDER — GLIMEPIRIDE 2 MG/1
TABLET ORAL
Qty: 90 TABLET | Refills: 1 | Status: SHIPPED | OUTPATIENT
Start: 2020-08-07 | End: 2020-08-13

## 2020-08-07 RX ORDER — PRAVASTATIN SODIUM 20 MG
TABLET ORAL
Qty: 90 TABLET | Refills: 1 | Status: SHIPPED | OUTPATIENT
Start: 2020-08-07 | End: 2020-08-13

## 2020-08-07 RX ORDER — METOPROLOL SUCCINATE 25 MG/1
25 TABLET, EXTENDED RELEASE ORAL
Qty: 90 TABLET | Refills: 1 | Status: SHIPPED | OUTPATIENT
Start: 2020-08-07 | End: 2020-08-13

## 2020-08-07 NOTE — TELEPHONE ENCOUNTER
Failed protocol - labs and appointment needed - [employer] message sent to pt to contact office to schedule a BP and DM f/u    Medication(s) to Refill:   Requested Prescriptions     Pending Prescriptions Disp Refills   • Mometasone Furoate 0.1 % External Crea

## 2020-08-12 ENCOUNTER — PATIENT MESSAGE (OUTPATIENT)
Dept: INTERNAL MEDICINE CLINIC | Facility: CLINIC | Age: 65
End: 2020-08-12

## 2020-08-12 DIAGNOSIS — E11.9 TYPE 2 DIABETES MELLITUS WITHOUT COMPLICATION, WITHOUT LONG-TERM CURRENT USE OF INSULIN (HCC): Chronic | ICD-10-CM

## 2020-08-12 DIAGNOSIS — E78.00 HYPERCHOLESTEREMIA: ICD-10-CM

## 2020-08-12 DIAGNOSIS — I10 ESSENTIAL HYPERTENSION, BENIGN: Chronic | ICD-10-CM

## 2020-08-12 NOTE — TELEPHONE ENCOUNTER
Patient switching insurance to [facility] and pharmacy    Protocol failed - labs and appointment needed - Mychart message sent to pt to contact office to schedule DM follow up    Medication(s) to Refill:   Requested Prescriptions     Pending Prescript

## 2020-08-13 RX ORDER — AMLODIPINE BESYLATE 10 MG/1
10 TABLET ORAL DAILY
Qty: 90 TABLET | Refills: 0 | Status: SHIPPED | OUTPATIENT
Start: 2020-08-13 | End: 2020-11-06

## 2020-08-13 RX ORDER — LISINOPRIL AND HYDROCHLOROTHIAZIDE 20; 12.5 MG/1; MG/1
2 TABLET ORAL DAILY
Qty: 180 TABLET | Refills: 0 | Status: SHIPPED | OUTPATIENT
Start: 2020-08-13 | End: 2020-10-08

## 2020-08-13 RX ORDER — METOPROLOL SUCCINATE 25 MG/1
25 TABLET, EXTENDED RELEASE ORAL
Qty: 90 TABLET | Refills: 1 | Status: SHIPPED | OUTPATIENT
Start: 2020-08-13 | End: 2021-03-15

## 2020-08-13 RX ORDER — GLIMEPIRIDE 2 MG/1
TABLET ORAL
Qty: 90 TABLET | Refills: 1 | Status: SHIPPED | OUTPATIENT
Start: 2020-08-13 | End: 2020-11-06 | Stop reason: DRUGHIGH

## 2020-08-13 RX ORDER — PRAVASTATIN SODIUM 20 MG
TABLET ORAL
Qty: 90 TABLET | Refills: 1 | Status: SHIPPED | OUTPATIENT
Start: 2020-08-13 | End: 2020-11-06

## 2020-08-13 NOTE — TELEPHONE ENCOUNTER
Passed protocol    Medication(s) to Refill:   Requested Prescriptions     Pending Prescriptions Disp Refills   • amLODIPine Besylate 10 MG Oral Tab 90 tablet 0     Sig: Take 1 tablet (10 mg total) by mouth daily.    • Lisinopril-hydroCHLOROthiazide 20-12.5

## 2020-08-13 NOTE — TELEPHONE ENCOUNTER
Patient has picked up all of his prescriptions from [facility] on 08/07/2020, because he was running out and needed to take his medicine.   However, since pt has switched insurance plans, and [place] [facility] as to why he wanted his prescription

## 2020-08-31 ENCOUNTER — LAB ENCOUNTER (OUTPATIENT)
Dept: LAB | Age: 65
End: 2020-08-31
Attending: INTERNAL MEDICINE
Payer: COMMERCIAL

## 2020-08-31 DIAGNOSIS — E78.00 ELEVATED LDL CHOLESTEROL LEVEL: ICD-10-CM

## 2020-08-31 DIAGNOSIS — E78.00 HYPERCHOLESTEREMIA: Chronic | ICD-10-CM

## 2020-08-31 LAB
CHOLEST SMN-MCNC: 130 MG/DL (ref ?–200)
HDLC SERPL-MCNC: 60 MG/DL (ref 40–59)
LDLC SERPL CALC-MCNC: 59 MG/DL (ref ?–100)
NONHDLC SERPL-MCNC: 70 MG/DL (ref ?–130)
PATIENT FASTING Y/N/NP: YES
TRIGL SERPL-MCNC: 54 MG/DL (ref 30–149)
VLDLC SERPL CALC-MCNC: 11 MG/DL (ref 0–30)

## 2020-08-31 PROCEDURE — 80061 LIPID PANEL: CPT | Performed by: INTERNAL MEDICINE

## 2020-08-31 PROCEDURE — 36415 COLL VENOUS BLD VENIPUNCTURE: CPT | Performed by: INTERNAL MEDICINE

## 2020-10-07 DIAGNOSIS — I10 ESSENTIAL HYPERTENSION, BENIGN: Chronic | ICD-10-CM

## 2020-10-08 RX ORDER — LISINOPRIL AND HYDROCHLOROTHIAZIDE 20; 12.5 MG/1; MG/1
2 TABLET ORAL DAILY
Qty: 180 TABLET | Refills: 0 | Status: SHIPPED | OUTPATIENT
Start: 2020-10-08 | End: 2020-11-30

## 2020-10-08 NOTE — TELEPHONE ENCOUNTER
Passed protocol    Requesting Lisinopril-hydroCHLOROthiazide 20-12.5 MG Oral Tab  LOV: 11/18/19  RTC: 6 months  Last Relevant Labs: 1/13/19  Filled: 8/13/20 #180 with 0 refills    Future Appointments   Date Time Provider Dmitri Martin   10/27/2020  7:3

## 2020-10-27 ENCOUNTER — LAB ENCOUNTER (OUTPATIENT)
Dept: LAB | Age: 65
End: 2020-10-27
Attending: INTERNAL MEDICINE
Payer: MEDICARE

## 2020-10-27 DIAGNOSIS — E11.9 TYPE 2 DIABETES MELLITUS WITHOUT COMPLICATION, WITHOUT LONG-TERM CURRENT USE OF INSULIN (HCC): Chronic | ICD-10-CM

## 2020-10-27 DIAGNOSIS — Z12.5 SCREENING FOR MALIGNANT NEOPLASM OF PROSTATE: ICD-10-CM

## 2020-10-27 DIAGNOSIS — Z13.29 SCREENING FOR THYROID DISORDER: ICD-10-CM

## 2020-10-27 DIAGNOSIS — I10 ESSENTIAL HYPERTENSION, BENIGN: Chronic | ICD-10-CM

## 2020-10-27 DIAGNOSIS — Z00.00 LABORATORY EXAMINATION ORDERED AS PART OF A ROUTINE GENERAL MEDICAL EXAMINATION: ICD-10-CM

## 2020-10-27 PROCEDURE — 84436 ASSAY OF TOTAL THYROXINE: CPT

## 2020-10-27 PROCEDURE — 80053 COMPREHEN METABOLIC PANEL: CPT

## 2020-10-27 PROCEDURE — 36415 COLL VENOUS BLD VENIPUNCTURE: CPT

## 2020-10-27 PROCEDURE — 83036 HEMOGLOBIN GLYCOSYLATED A1C: CPT

## 2020-10-27 PROCEDURE — 81003 URINALYSIS AUTO W/O SCOPE: CPT

## 2020-10-27 PROCEDURE — 84443 ASSAY THYROID STIM HORMONE: CPT

## 2020-10-27 PROCEDURE — 82570 ASSAY OF URINE CREATININE: CPT

## 2020-10-27 PROCEDURE — 85025 COMPLETE CBC W/AUTO DIFF WBC: CPT

## 2020-10-27 PROCEDURE — 82043 UR ALBUMIN QUANTITATIVE: CPT

## 2020-11-06 ENCOUNTER — OFFICE VISIT (OUTPATIENT)
Dept: INTERNAL MEDICINE CLINIC | Facility: CLINIC | Age: 65
End: 2020-11-06
Payer: MEDICARE

## 2020-11-06 VITALS
TEMPERATURE: 98 F | RESPIRATION RATE: 16 BRPM | HEIGHT: 70.75 IN | DIASTOLIC BLOOD PRESSURE: 64 MMHG | HEART RATE: 74 BPM | WEIGHT: 259 LBS | BODY MASS INDEX: 36.26 KG/M2 | SYSTOLIC BLOOD PRESSURE: 116 MMHG | OXYGEN SATURATION: 98 %

## 2020-11-06 DIAGNOSIS — M25.511 CHRONIC PAIN OF BOTH SHOULDERS: Chronic | ICD-10-CM

## 2020-11-06 DIAGNOSIS — E11.9 TYPE 2 DIABETES MELLITUS WITHOUT COMPLICATION, WITHOUT LONG-TERM CURRENT USE OF INSULIN (HCC): Chronic | ICD-10-CM

## 2020-11-06 DIAGNOSIS — Z00.00 ROUTINE GENERAL MEDICAL EXAMINATION AT A HEALTH CARE FACILITY: Primary | ICD-10-CM

## 2020-11-06 DIAGNOSIS — M25.512 CHRONIC PAIN OF BOTH SHOULDERS: Chronic | ICD-10-CM

## 2020-11-06 DIAGNOSIS — E78.00 HYPERCHOLESTEREMIA: Chronic | ICD-10-CM

## 2020-11-06 DIAGNOSIS — I73.9 PAD (PERIPHERAL ARTERY DISEASE) (HCC): Chronic | ICD-10-CM

## 2020-11-06 DIAGNOSIS — I10 ESSENTIAL HYPERTENSION, BENIGN: Chronic | ICD-10-CM

## 2020-11-06 DIAGNOSIS — R94.31 ABNORMAL EKG: ICD-10-CM

## 2020-11-06 DIAGNOSIS — G89.29 CHRONIC PAIN OF BOTH SHOULDERS: Chronic | ICD-10-CM

## 2020-11-06 DIAGNOSIS — I83.893 VARICOSE VEINS OF BOTH LEGS WITH EDEMA: ICD-10-CM

## 2020-11-06 PROCEDURE — 90662 IIV NO PRSV INCREASED AG IM: CPT | Performed by: INTERNAL MEDICINE

## 2020-11-06 PROCEDURE — 3008F BODY MASS INDEX DOCD: CPT | Performed by: INTERNAL MEDICINE

## 2020-11-06 PROCEDURE — G0403 EKG FOR INITIAL PREVENT EXAM: HCPCS | Performed by: INTERNAL MEDICINE

## 2020-11-06 PROCEDURE — G0008 ADMIN INFLUENZA VIRUS VAC: HCPCS | Performed by: INTERNAL MEDICINE

## 2020-11-06 PROCEDURE — 96160 PT-FOCUSED HLTH RISK ASSMT: CPT | Performed by: INTERNAL MEDICINE

## 2020-11-06 PROCEDURE — G0402 INITIAL PREVENTIVE EXAM: HCPCS | Performed by: INTERNAL MEDICINE

## 2020-11-06 PROCEDURE — 3074F SYST BP LT 130 MM HG: CPT | Performed by: INTERNAL MEDICINE

## 2020-11-06 PROCEDURE — 99397 PER PM REEVAL EST PAT 65+ YR: CPT | Performed by: INTERNAL MEDICINE

## 2020-11-06 PROCEDURE — 3078F DIAST BP <80 MM HG: CPT | Performed by: INTERNAL MEDICINE

## 2020-11-06 RX ORDER — PRAVASTATIN SODIUM 10 MG
TABLET ORAL
COMMUNITY
Start: 2020-11-06 | End: 2021-03-15

## 2020-11-06 RX ORDER — AMLODIPINE BESYLATE 5 MG/1
5 TABLET ORAL DAILY
COMMUNITY
Start: 2020-11-06 | End: 2021-10-29 | Stop reason: ALTCHOICE

## 2020-11-06 NOTE — PROGRESS NOTES
REASON FOR VISIT:    Kiley Guevara is a 72year old male who presents for a Medicare Annual Wellness visit.     yes     Patient Care Team: Patient Care Team:  Pratibha Love MD as PCP - General (Internal Medicine)  Xi Quezada MD ([facility] 2833) 60(H) 50 56 54 52 50 48   LDL <100 mg/dL 59 114(H) 63 69 120(H) 108(H) 113   Some recent data might be hidden     BUN and Cr Latest Ref Rng & Units 10/27/2020 11/13/2019 3/13/2019 9/18/2018 3/27/2018 9/14/2017 4/3/2017   BUN 7 - 18 mg/dL 19(H) 19(H) 16 19 activities? : No  Memory Problems?: No      Fall/Risk Assessment     Have you fallen in the last 12 months?: 0-No  Do you have 3 or more medical conditions?: 1-Yes  Do you accidently lose urine?: 0-No  Do you have difficulty seeing?: 0-No  Do you have any 10/27/2020 0.83       LDL  Annually LDL Cholesterol (mg/dL)   Date Value   08/31/2020 59       Dilated Eye exam  Annually Data entered on: 8/1/2019   Last Dilated Eye Exam 8/1/2019              ALLERGIES:   No Known Allergies  MEDICAL INFORMATION:   Past SOCIAL HISTORY:   Social History    Tobacco Use      Smoking status: Former Smoker        Packs/day: 0.50        Years: 40.00        Pack years: 20      Smokeless tobacco: Never Used      Tobacco comment: occ    Alcohol use: Yes      Frequency: 2-4 times Patient denies arthritis ,+ for chronic low back pain, muscle weakness none. Joint pain chronic Left  and right Shoulder pain. Joint stiffness none. Peripheral Vascular:   General + for large varicosities, no claudication. Dermatologic:   Rash none. veins: present, bilaterally extensive. MUSCULOSKELETAL:   Cervical spines: normal.   L-S spines: decreased range of motion secondary to pain. Lower extremity joints: OA knees.    Upper extremity joints: left and right shoulder, stiffness -abd and rotati interval 156 ms poor R wave progression unchanged from earlier  The patient indicates understanding of these issues and agrees to the plan.   The patient is asked to return in 3 months for medication check  Diet counseling perfomed    SUGGESTED VACCINATIONS

## 2020-11-27 DIAGNOSIS — I10 ESSENTIAL HYPERTENSION, BENIGN: Chronic | ICD-10-CM

## 2020-11-30 RX ORDER — AMLODIPINE BESYLATE 5 MG/1
5 TABLET ORAL DAILY
Qty: 90 TABLET | Refills: 1 | Status: SHIPPED | OUTPATIENT
Start: 2020-11-30 | End: 2021-10-29 | Stop reason: ALTCHOICE

## 2020-11-30 RX ORDER — LISINOPRIL AND HYDROCHLOROTHIAZIDE 20; 12.5 MG/1; MG/1
TABLET ORAL
Qty: 180 TABLET | Refills: 1 | Status: SHIPPED | OUTPATIENT
Start: 2020-11-30 | End: 2021-06-15

## 2020-11-30 NOTE — TELEPHONE ENCOUNTER
Passed protocol - Pt no longer taking Amlodipine 10mg - dosage was decreased 11/6/20 to 5mg - confirmed with pt.      Requesting LISINOPRIL-HYDROCHLOROTHIAZIDE 20-12.5 MG Oral Tab  LOV: 11/6/20  RTC: 3 months  Last Relevant Labs: 10/27/20  Filled: 10/8/20 #

## 2021-02-03 ENCOUNTER — TELEPHONE (OUTPATIENT)
Dept: INTERNAL MEDICINE CLINIC | Facility: CLINIC | Age: 66
End: 2021-02-03

## 2021-02-03 DIAGNOSIS — Z23 NEED FOR VACCINATION: ICD-10-CM

## 2021-02-03 NOTE — TELEPHONE ENCOUNTER
Patient received a message to schedule his COVID19 vaccine. He stated that he just had his shingles shot and wants to know if it's ok to schedule the COVID19 vaccine. Please advise.

## 2021-02-08 ENCOUNTER — IMMUNIZATION (OUTPATIENT)
Dept: LAB | Age: 66
End: 2021-02-08
Attending: HOSPITALIST
Payer: MEDICARE

## 2021-02-08 DIAGNOSIS — Z23 NEED FOR VACCINATION: Primary | ICD-10-CM

## 2021-02-08 PROCEDURE — 0001A SARSCOV2 VAC 30MCG/0.3ML IM: CPT

## 2021-02-25 ENCOUNTER — LAB ENCOUNTER (OUTPATIENT)
Dept: LAB | Age: 66
End: 2021-02-25
Attending: INTERNAL MEDICINE
Payer: MEDICARE

## 2021-02-25 DIAGNOSIS — E11.9 TYPE 2 DIABETES MELLITUS WITHOUT COMPLICATION, WITHOUT LONG-TERM CURRENT USE OF INSULIN (HCC): Chronic | ICD-10-CM

## 2021-02-25 DIAGNOSIS — I10 ESSENTIAL HYPERTENSION, BENIGN: Chronic | ICD-10-CM

## 2021-02-25 DIAGNOSIS — E78.00 HYPERCHOLESTEREMIA: Chronic | ICD-10-CM

## 2021-02-25 LAB
ALBUMIN SERPL-MCNC: 3.7 G/DL (ref 3.4–5)
ALBUMIN/GLOB SERPL: 0.9 {RATIO} (ref 1–2)
ALP LIVER SERPL-CCNC: 48 U/L
ALT SERPL-CCNC: 19 U/L
ANION GAP SERPL CALC-SCNC: 7 MMOL/L (ref 0–18)
AST SERPL-CCNC: 15 U/L (ref 15–37)
BILIRUB SERPL-MCNC: 0.7 MG/DL (ref 0.1–2)
BUN BLD-MCNC: 20 MG/DL (ref 7–18)
BUN/CREAT SERPL: 21.1 (ref 10–20)
CALCIUM BLD-MCNC: 9.8 MG/DL (ref 8.5–10.1)
CHLORIDE SERPL-SCNC: 100 MMOL/L (ref 98–112)
CHOLEST SMN-MCNC: 156 MG/DL (ref ?–200)
CO2 SERPL-SCNC: 26 MMOL/L (ref 21–32)
CREAT BLD-MCNC: 0.95 MG/DL
EST. AVERAGE GLUCOSE BLD GHB EST-MCNC: 131 MG/DL (ref 68–126)
GLOBULIN PLAS-MCNC: 3.9 G/DL (ref 2.8–4.4)
GLUCOSE BLD-MCNC: 110 MG/DL (ref 70–99)
HBA1C MFR BLD HPLC: 6.2 % (ref ?–5.7)
HDLC SERPL-MCNC: 64 MG/DL (ref 40–59)
LDLC SERPL CALC-MCNC: 75 MG/DL (ref ?–100)
M PROTEIN MFR SERPL ELPH: 7.6 G/DL (ref 6.4–8.2)
NONHDLC SERPL-MCNC: 92 MG/DL (ref ?–130)
OSMOLALITY SERPL CALC.SUM OF ELEC: 279 MOSM/KG (ref 275–295)
POTASSIUM SERPL-SCNC: 4.7 MMOL/L (ref 3.5–5.1)
SODIUM SERPL-SCNC: 133 MMOL/L (ref 136–145)
TRIGL SERPL-MCNC: 83 MG/DL (ref 30–149)
TSI SER-ACNC: 1.62 MIU/ML (ref 0.36–3.74)
VLDLC SERPL CALC-MCNC: 17 MG/DL (ref 0–30)

## 2021-02-25 PROCEDURE — 84443 ASSAY THYROID STIM HORMONE: CPT

## 2021-02-25 PROCEDURE — 80053 COMPREHEN METABOLIC PANEL: CPT

## 2021-02-25 PROCEDURE — 83036 HEMOGLOBIN GLYCOSYLATED A1C: CPT

## 2021-02-25 PROCEDURE — 80061 LIPID PANEL: CPT

## 2021-02-25 PROCEDURE — 36415 COLL VENOUS BLD VENIPUNCTURE: CPT

## 2021-03-01 ENCOUNTER — IMMUNIZATION (OUTPATIENT)
Dept: LAB | Age: 66
End: 2021-03-01
Attending: HOSPITALIST
Payer: MEDICARE

## 2021-03-01 DIAGNOSIS — Z23 NEED FOR VACCINATION: Primary | ICD-10-CM

## 2021-03-01 PROCEDURE — 0002A SARSCOV2 VAC 30MCG/0.3ML IM: CPT

## 2021-03-11 ENCOUNTER — PATIENT MESSAGE (OUTPATIENT)
Dept: INTERNAL MEDICINE CLINIC | Facility: CLINIC | Age: 66
End: 2021-03-11

## 2021-03-11 DIAGNOSIS — E78.00 HYPERCHOLESTEREMIA: Chronic | ICD-10-CM

## 2021-03-11 DIAGNOSIS — I10 ESSENTIAL HYPERTENSION, BENIGN: Chronic | ICD-10-CM

## 2021-03-15 RX ORDER — METOPROLOL SUCCINATE 25 MG/1
25 TABLET, EXTENDED RELEASE ORAL
Qty: 90 TABLET | Refills: 1 | Status: SHIPPED | OUTPATIENT
Start: 2021-03-15 | End: 2021-09-16

## 2021-03-15 RX ORDER — PRAVASTATIN SODIUM 10 MG
TABLET ORAL
Qty: 90 TABLET | Refills: 0 | Status: SHIPPED | OUTPATIENT
Start: 2021-03-15 | End: 2021-06-15

## 2021-03-15 NOTE — TELEPHONE ENCOUNTER
Jazzy Farias, RN 3/11/2021 3:43 PM CST      ----- Message -----  From: Marianela Estevez  Sent: 3/11/2021 1:32 PM CST  To: [facility] Clinical Staff  Subject: Prescription Question     Hello,  I am sending this note because I need to have 3 RX's refilled.

## 2021-03-15 NOTE — TELEPHONE ENCOUNTER
Protocol passed   Medication(s) to Refill:   Requested Prescriptions     Pending Prescriptions Disp Refills   • metFORMIN HCl 850 MG Oral Tab 270 tablet 1     Sig: Take 1 tablet (850 mg total) by mouth 3 (three) times daily.    • Metoprolol Succinate ER 25

## 2021-05-15 RX ORDER — AMLODIPINE BESYLATE 5 MG/1
TABLET ORAL
Qty: 90 TABLET | Refills: 0 | Status: SHIPPED | OUTPATIENT
Start: 2021-05-15 | End: 2021-09-07

## 2021-06-12 DIAGNOSIS — I10 ESSENTIAL HYPERTENSION, BENIGN: Chronic | ICD-10-CM

## 2021-06-14 DIAGNOSIS — E78.00 HYPERCHOLESTEREMIA: Chronic | ICD-10-CM

## 2021-06-14 NOTE — TELEPHONE ENCOUNTER
Appointment needed - ClickingHousehart message sent to pt to contact office to schedule med f/u      Requesting LISINOPRIL-HYDROCHLOROTHIAZIDE 20-12.5 MG Oral Tab  LOV: 11/6/20  RTC: 3 months  Last Relevant Labs: 2/25/21  Filled: 11/30/20 #180 with 1 refills    No f

## 2021-06-15 RX ORDER — LISINOPRIL AND HYDROCHLOROTHIAZIDE 20; 12.5 MG/1; MG/1
TABLET ORAL
Qty: 180 TABLET | Refills: 1 | Status: SHIPPED | OUTPATIENT
Start: 2021-06-15 | End: 2021-11-09

## 2021-06-15 RX ORDER — PRAVASTATIN SODIUM 10 MG
TABLET ORAL
Qty: 90 TABLET | Refills: 0 | Status: SHIPPED | OUTPATIENT
Start: 2021-06-15 | End: 2021-09-07

## 2021-06-15 NOTE — TELEPHONE ENCOUNTER
Pravastatin 10 mg  Filled 3-15-21  Qty 90  0 refills  No upcoming appt.   LOV 11-6-20  Labs:  2-25-21: Lipid

## 2021-08-13 ENCOUNTER — LAB ENCOUNTER (OUTPATIENT)
Dept: LAB | Age: 66
End: 2021-08-13
Attending: INTERNAL MEDICINE
Payer: MEDICARE

## 2021-08-13 DIAGNOSIS — E11.9 TYPE 2 DIABETES MELLITUS WITHOUT COMPLICATION, WITHOUT LONG-TERM CURRENT USE OF INSULIN (HCC): ICD-10-CM

## 2021-08-13 DIAGNOSIS — I10 ESSENTIAL HYPERTENSION, BENIGN: ICD-10-CM

## 2021-08-13 DIAGNOSIS — E78.00 HYPERCHOLESTEREMIA: ICD-10-CM

## 2021-08-13 DIAGNOSIS — E78.00 ELEVATED LDL CHOLESTEROL LEVEL: ICD-10-CM

## 2021-08-13 DIAGNOSIS — Z00.00 LABORATORY EXAMINATION ORDERED AS PART OF A ROUTINE GENERAL MEDICAL EXAMINATION: ICD-10-CM

## 2021-08-13 LAB
ALBUMIN SERPL-MCNC: 3.5 G/DL (ref 3.4–5)
ALBUMIN/GLOB SERPL: 0.9 {RATIO} (ref 1–2)
ALP LIVER SERPL-CCNC: 47 U/L
ALT SERPL-CCNC: 24 U/L
ANION GAP SERPL CALC-SCNC: 3 MMOL/L (ref 0–18)
AST SERPL-CCNC: 21 U/L (ref 15–37)
BASOPHILS # BLD AUTO: 0.08 X10(3) UL (ref 0–0.2)
BASOPHILS NFR BLD AUTO: 1.1 %
BILIRUB SERPL-MCNC: 0.5 MG/DL (ref 0.1–2)
BILIRUB UR QL STRIP.AUTO: NEGATIVE
BUN BLD-MCNC: 16 MG/DL (ref 7–18)
CALCIUM BLD-MCNC: 9.2 MG/DL (ref 8.5–10.1)
CHLORIDE SERPL-SCNC: 101 MMOL/L (ref 98–112)
CHOLEST SMN-MCNC: 148 MG/DL (ref ?–200)
CLARITY UR REFRACT.AUTO: CLEAR
CO2 SERPL-SCNC: 27 MMOL/L (ref 21–32)
COLOR UR AUTO: YELLOW
COMPLEXED PSA SERPL-MCNC: 0.79 NG/ML (ref ?–4)
CREAT BLD-MCNC: 0.94 MG/DL
CREAT UR-SCNC: 31.6 MG/DL
EOSINOPHIL # BLD AUTO: 0.3 X10(3) UL (ref 0–0.7)
EOSINOPHIL NFR BLD AUTO: 4.1 %
ERYTHROCYTE [DISTWIDTH] IN BLOOD BY AUTOMATED COUNT: 13.9 %
EST. AVERAGE GLUCOSE BLD GHB EST-MCNC: 117 MG/DL (ref 68–126)
GLOBULIN PLAS-MCNC: 4.1 G/DL (ref 2.8–4.4)
GLUCOSE BLD-MCNC: 83 MG/DL (ref 70–99)
GLUCOSE UR STRIP.AUTO-MCNC: NEGATIVE MG/DL
HBA1C MFR BLD HPLC: 5.7 % (ref ?–5.7)
HCT VFR BLD AUTO: 42.8 %
HDLC SERPL-MCNC: 63 MG/DL (ref 40–59)
HGB BLD-MCNC: 14.2 G/DL
IMM GRANULOCYTES # BLD AUTO: 0.04 X10(3) UL (ref 0–1)
IMM GRANULOCYTES NFR BLD: 0.6 %
KETONES UR STRIP.AUTO-MCNC: NEGATIVE MG/DL
LDLC SERPL CALC-MCNC: 73 MG/DL (ref ?–100)
LEUKOCYTE ESTERASE UR QL STRIP.AUTO: NEGATIVE
LYMPHOCYTES # BLD AUTO: 2.93 X10(3) UL (ref 1–4)
LYMPHOCYTES NFR BLD AUTO: 40.3 %
M PROTEIN MFR SERPL ELPH: 7.6 G/DL (ref 6.4–8.2)
MCH RBC QN AUTO: 30.9 PG (ref 26–34)
MCHC RBC AUTO-ENTMCNC: 33.2 G/DL (ref 31–37)
MCV RBC AUTO: 93.2 FL
MICROALBUMIN UR-MCNC: 5.24 MG/DL
MICROALBUMIN/CREAT 24H UR-RTO: 165.8 UG/MG (ref ?–30)
MONOCYTES # BLD AUTO: 0.69 X10(3) UL (ref 0.1–1)
MONOCYTES NFR BLD AUTO: 9.5 %
NEUTROPHILS # BLD AUTO: 3.23 X10 (3) UL (ref 1.5–7.7)
NEUTROPHILS # BLD AUTO: 3.23 X10(3) UL (ref 1.5–7.7)
NEUTROPHILS NFR BLD AUTO: 44.4 %
NITRITE UR QL STRIP.AUTO: NEGATIVE
NONHDLC SERPL-MCNC: 85 MG/DL (ref ?–130)
OSMOLALITY SERPL CALC.SUM OF ELEC: 272 MOSM/KG (ref 275–295)
PATIENT FASTING Y/N/NP: YES
PATIENT FASTING Y/N/NP: YES
PH UR STRIP.AUTO: 7 [PH] (ref 5–8)
PLATELET # BLD AUTO: 197 10(3)UL (ref 150–450)
POTASSIUM SERPL-SCNC: 4.6 MMOL/L (ref 3.5–5.1)
PROT UR STRIP.AUTO-MCNC: NEGATIVE MG/DL
RBC # BLD AUTO: 4.59 X10(6)UL
RBC UR QL AUTO: NEGATIVE
SODIUM SERPL-SCNC: 131 MMOL/L (ref 136–145)
SP GR UR STRIP.AUTO: 1.01 (ref 1–1.03)
T4 SERPL-MCNC: 7.2 UG/DL
TRIGL SERPL-MCNC: 58 MG/DL (ref 30–149)
TSI SER-ACNC: 1.46 MIU/ML (ref 0.36–3.74)
UROBILINOGEN UR STRIP.AUTO-MCNC: <2 MG/DL
VLDLC SERPL CALC-MCNC: 9 MG/DL (ref 0–30)
WBC # BLD AUTO: 7.3 X10(3) UL (ref 4–11)

## 2021-08-13 PROCEDURE — 82570 ASSAY OF URINE CREATININE: CPT

## 2021-08-13 PROCEDURE — 80053 COMPREHEN METABOLIC PANEL: CPT

## 2021-08-13 PROCEDURE — 3060F POS MICROALBUMINURIA REV: CPT | Performed by: INTERNAL MEDICINE

## 2021-08-13 PROCEDURE — 85025 COMPLETE CBC W/AUTO DIFF WBC: CPT

## 2021-08-13 PROCEDURE — 81003 URINALYSIS AUTO W/O SCOPE: CPT

## 2021-08-13 PROCEDURE — 3044F HG A1C LEVEL LT 7.0%: CPT | Performed by: INTERNAL MEDICINE

## 2021-08-13 PROCEDURE — 3061F NEG MICROALBUMINURIA REV: CPT | Performed by: INTERNAL MEDICINE

## 2021-08-13 PROCEDURE — 84443 ASSAY THYROID STIM HORMONE: CPT

## 2021-08-13 PROCEDURE — 80061 LIPID PANEL: CPT

## 2021-08-13 PROCEDURE — 82043 UR ALBUMIN QUANTITATIVE: CPT

## 2021-08-13 PROCEDURE — 36415 COLL VENOUS BLD VENIPUNCTURE: CPT

## 2021-08-13 PROCEDURE — 83036 HEMOGLOBIN GLYCOSYLATED A1C: CPT

## 2021-08-13 PROCEDURE — 84436 ASSAY OF TOTAL THYROXINE: CPT

## 2021-09-04 DIAGNOSIS — E78.00 HYPERCHOLESTEREMIA: Chronic | ICD-10-CM

## 2021-09-07 RX ORDER — AMLODIPINE BESYLATE 5 MG/1
TABLET ORAL
Qty: 90 TABLET | Refills: 0 | Status: SHIPPED | OUTPATIENT
Start: 2021-09-07 | End: 2021-11-20

## 2021-09-07 RX ORDER — PRAVASTATIN SODIUM 10 MG
TABLET ORAL
Qty: 90 TABLET | Refills: 0 | Status: SHIPPED | OUTPATIENT
Start: 2021-09-07 | End: 2021-11-20

## 2021-09-07 NOTE — TELEPHONE ENCOUNTER
Medication(s) to Refill:   Requested Prescriptions     Pending Prescriptions Disp Refills   • AMLODIPINE 5 MG Oral Tab [Pharmacy Med Name: AMLODIPINE BESYLATE 5 MG Tablet] 90 tablet 0     Sig: TAKE 1 TABLET EVERY DAY   • PRAVASTATIN 10 MG Oral Tab [Pharmac

## 2021-09-15 DIAGNOSIS — I10 ESSENTIAL HYPERTENSION, BENIGN: Chronic | ICD-10-CM

## 2021-09-16 RX ORDER — METOPROLOL SUCCINATE 25 MG/1
TABLET, EXTENDED RELEASE ORAL
Qty: 90 TABLET | Refills: 1 | Status: SHIPPED | OUTPATIENT
Start: 2021-09-16

## 2021-09-16 NOTE — TELEPHONE ENCOUNTER
Protocol passed     Requesting:   Metformin hcl 850mg filled 3/15/2021 #270 1 refill   Metoprolol succinate 25mg filled 3/15/21 #90 1 refill     LOV: 11/6/20   RTC: 3 months   Recent Labs: 8/13/21     Upcoming OV: 10/29/21

## 2021-10-29 ENCOUNTER — OFFICE VISIT (OUTPATIENT)
Dept: INTERNAL MEDICINE CLINIC | Facility: CLINIC | Age: 66
End: 2021-10-29
Payer: MEDICARE

## 2021-10-29 VITALS
DIASTOLIC BLOOD PRESSURE: 70 MMHG | SYSTOLIC BLOOD PRESSURE: 136 MMHG | RESPIRATION RATE: 16 BRPM | TEMPERATURE: 98 F | BODY MASS INDEX: 31.45 KG/M2 | HEIGHT: 70.5 IN | WEIGHT: 222.19 LBS | OXYGEN SATURATION: 99 % | HEART RATE: 88 BPM

## 2021-10-29 DIAGNOSIS — M25.511 CHRONIC PAIN OF BOTH SHOULDERS: Chronic | ICD-10-CM

## 2021-10-29 DIAGNOSIS — M25.512 CHRONIC PAIN OF BOTH SHOULDERS: Chronic | ICD-10-CM

## 2021-10-29 DIAGNOSIS — G89.29 CHRONIC PAIN OF BOTH SHOULDERS: Chronic | ICD-10-CM

## 2021-10-29 DIAGNOSIS — I83.893 VARICOSE VEINS OF BOTH LEGS WITH EDEMA: Chronic | ICD-10-CM

## 2021-10-29 DIAGNOSIS — E78.00 HYPERCHOLESTEREMIA: Chronic | ICD-10-CM

## 2021-10-29 DIAGNOSIS — Z00.00 ROUTINE GENERAL MEDICAL EXAMINATION AT A HEALTH CARE FACILITY: Primary | ICD-10-CM

## 2021-10-29 DIAGNOSIS — E11.9 TYPE 2 DIABETES MELLITUS WITHOUT COMPLICATION, WITHOUT LONG-TERM CURRENT USE OF INSULIN (HCC): Chronic | ICD-10-CM

## 2021-10-29 DIAGNOSIS — I10 ESSENTIAL HYPERTENSION, BENIGN: Chronic | ICD-10-CM

## 2021-10-29 DIAGNOSIS — I73.9 PAD (PERIPHERAL ARTERY DISEASE) (HCC): Chronic | ICD-10-CM

## 2021-10-29 PROCEDURE — 96160 PT-FOCUSED HLTH RISK ASSMT: CPT | Performed by: INTERNAL MEDICINE

## 2021-10-29 PROCEDURE — G0008 ADMIN INFLUENZA VIRUS VAC: HCPCS | Performed by: INTERNAL MEDICINE

## 2021-10-29 PROCEDURE — 90662 IIV NO PRSV INCREASED AG IM: CPT | Performed by: INTERNAL MEDICINE

## 2021-10-29 PROCEDURE — 3078F DIAST BP <80 MM HG: CPT | Performed by: INTERNAL MEDICINE

## 2021-10-29 PROCEDURE — 3075F SYST BP GE 130 - 139MM HG: CPT | Performed by: INTERNAL MEDICINE

## 2021-10-29 PROCEDURE — 3008F BODY MASS INDEX DOCD: CPT | Performed by: INTERNAL MEDICINE

## 2021-10-29 PROCEDURE — 93000 ELECTROCARDIOGRAM COMPLETE: CPT | Performed by: INTERNAL MEDICINE

## 2021-10-29 PROCEDURE — 99397 PER PM REEVAL EST PAT 65+ YR: CPT | Performed by: INTERNAL MEDICINE

## 2021-10-29 PROCEDURE — G0438 PPPS, INITIAL VISIT: HCPCS | Performed by: INTERNAL MEDICINE

## 2021-10-29 NOTE — PROGRESS NOTES
REASON FOR VISIT:    Elvira Paz is a 72year old male who presents for a MA Supervisit.    male     Patient Care Team: Patient Care Team:  Bianca Recinos MD as PCP - General (Internal Medicine)  Fitz Little MD ([facility])  Marium Tolbert MD Ref Rng & Units 8/13/2021 2/25/2021 10/27/2020 11/13/2019 3/13/2019 9/18/2018 3/27/2018   BUN 7 - 18 mg/dL 16 20(H) 19(H) 19(H) 16 19 18   Creatinine 0.70 - 1.30 mg/dL 0.94 0.95 0.83 0.97 0.92 0.96 0.99   Some recent data might be hidden     AST and ALT La Over the LAST 2 WEEKS            Advance Directives     Do you have a healthcare power of ?: No  Do you have a living will?: No     Cognitive Assessment     What day of the week is this?: Correct  What month is it?: Correct  What year is it?: Corre complication, not stated as uncontrolled    • Unspecified essential hypertension    • Varicose vein    • Visual impairment     reading glasses      Past Surgical History:   Procedure Laterality Date   • COLONOSCOPY N/A 10/14/2015    Procedure: COLONOSCOPY; Head no dizziness, no lightheadedness. Eyes normal vision, no redness , no drainage will schedule an eye appointment. Ears no earaches, no fullness, normal hearing, no tinnitus.  Nose and Sinuses no recurrent colds, no stuffiness, no discharge, no hay fev Trouble with balance none. Psychiatric:   Patient denies anxiety, depression, hallucinations. Insomnia none/no hx of sleep disorder.  Memory loss none.        EXAM:   /70   Pulse 88   Temp 98 °F (36.7 °C) (Oral)   Resp 16   Ht 5' 10.5\" (1.791 m) intact. Gait: normal.   Motor: Power,tone,co-ordination normalInvoluntary movements and wasting none. Reflexes: normal.   Sensory: all sensory modalities normal.   LYMPHATICS:   Cervical: none. Groin: no adenopathy . Inguinal: no adenopathy.    Supr

## 2021-11-08 DIAGNOSIS — I10 ESSENTIAL HYPERTENSION, BENIGN: Chronic | ICD-10-CM

## 2021-11-09 RX ORDER — LISINOPRIL AND HYDROCHLOROTHIAZIDE 20; 12.5 MG/1; MG/1
TABLET ORAL
Qty: 180 TABLET | Refills: 1 | Status: SHIPPED | OUTPATIENT
Start: 2021-11-09

## 2021-11-09 NOTE — TELEPHONE ENCOUNTER
Protocol passed     Requesting: LISINOPRIL-HYDROCHLOROTHIAZIDE 20-12.5 MG Oral Tab       LOV: 10/29/21   RTC: 6 months   Filled: 6/15/21 #180 1 refill   Recent Labs: 8/13/21     Upcoming OV: none scheduled

## 2021-11-19 DIAGNOSIS — E78.00 HYPERCHOLESTEREMIA: Chronic | ICD-10-CM

## 2021-11-20 ENCOUNTER — TELEPHONE (OUTPATIENT)
Dept: INTERNAL MEDICINE CLINIC | Facility: CLINIC | Age: 66
End: 2021-11-20

## 2021-11-20 RX ORDER — PRAVASTATIN SODIUM 10 MG
TABLET ORAL
Qty: 90 TABLET | Refills: 0 | Status: SHIPPED | OUTPATIENT
Start: 2021-11-20 | End: 2022-02-03

## 2021-11-20 RX ORDER — AMLODIPINE BESYLATE 5 MG/1
TABLET ORAL
Qty: 90 TABLET | Refills: 0 | Status: SHIPPED | OUTPATIENT
Start: 2021-11-20 | End: 2022-02-03

## 2021-11-20 NOTE — TELEPHONE ENCOUNTER
Pt called requesting a letter to be excused from jury duty. Pt states he got a letter stating he has to attend jury duty and he also received a questionnaire to complete.  Pt states he does not have a reliable car to drive to [facility] for jury d

## 2021-11-20 NOTE — TELEPHONE ENCOUNTER
Passed protocol     Last refill:    AMLODIPINE 5 MG Oral Tab 90 tablet 0 9/7/2021    Sig:   TAKE 1 TABLET EVERY DAY       PRAVASTATIN 10 MG Oral Tab 90 tablet 0 9/7/2021    Sig:   TAKE 1 TABLET EVERY DAY       LOV:   10/29/2021 Dr Roseline Garcia RTC 6 months  No

## 2021-11-22 NOTE — TELEPHONE ENCOUNTER
You can issue note stating the underlying medical diagnoses along with chronic low back pain and osteoarthritis of the knees. He can also state that this may interfere with his ambulation and transportation.   More than that we cannot elaborate  If you hav

## 2022-02-03 RX ORDER — AMLODIPINE BESYLATE 5 MG/1
TABLET ORAL
Qty: 90 TABLET | Refills: 0 | Status: SHIPPED | OUTPATIENT
Start: 2022-02-03

## 2022-02-03 RX ORDER — PRAVASTATIN SODIUM 10 MG
TABLET ORAL
Qty: 90 TABLET | Refills: 0 | Status: SHIPPED | OUTPATIENT
Start: 2022-02-03

## 2022-02-03 NOTE — TELEPHONE ENCOUNTER
Protocol passed     Requesting:   Pravastatin 10mg   Amlodipine 5mg     LOV: 10/29/21   RTC: 6 months   Filled: 11/20/21#90 0 refills   Recent Labs: 8/13/21     Upcoming OV: none scheduled

## 2022-02-10 RX ORDER — METOPROLOL SUCCINATE 25 MG/1
TABLET, EXTENDED RELEASE ORAL
Qty: 90 TABLET | Refills: 1 | OUTPATIENT
Start: 2022-02-10

## 2022-02-10 NOTE — TELEPHONE ENCOUNTER
Pt requesting refills too soon. Metformin filled 10/29/21 #270 with 1 refill. Metoprolol filled 9/16/32 #90 with 1 refill.

## 2022-02-23 ENCOUNTER — LAB ENCOUNTER (OUTPATIENT)
Dept: LAB | Age: 67
End: 2022-02-23
Attending: INTERNAL MEDICINE
Payer: MEDICARE

## 2022-02-23 DIAGNOSIS — I10 ESSENTIAL HYPERTENSION, BENIGN: Chronic | ICD-10-CM

## 2022-02-23 DIAGNOSIS — E11.9 TYPE 2 DIABETES MELLITUS WITHOUT COMPLICATION, WITHOUT LONG-TERM CURRENT USE OF INSULIN (HCC): Chronic | ICD-10-CM

## 2022-02-23 DIAGNOSIS — E78.00 HYPERCHOLESTEREMIA: Chronic | ICD-10-CM

## 2022-02-23 LAB
ALBUMIN SERPL-MCNC: 3.7 G/DL (ref 3.4–5)
ALBUMIN/GLOB SERPL: 1 {RATIO} (ref 1–2)
ALP LIVER SERPL-CCNC: 68 U/L
ALT SERPL-CCNC: 24 U/L
ANION GAP SERPL CALC-SCNC: 7 MMOL/L (ref 0–18)
AST SERPL-CCNC: 22 U/L (ref 15–37)
BILIRUB SERPL-MCNC: 0.6 MG/DL (ref 0.1–2)
BUN BLD-MCNC: 18 MG/DL (ref 7–18)
CALCIUM BLD-MCNC: 9.4 MG/DL (ref 8.5–10.1)
CHLORIDE SERPL-SCNC: 99 MMOL/L (ref 98–112)
CHOLEST SERPL-MCNC: 152 MG/DL (ref ?–200)
CO2 SERPL-SCNC: 28 MMOL/L (ref 21–32)
CREAT BLD-MCNC: 0.91 MG/DL
EST. AVERAGE GLUCOSE BLD GHB EST-MCNC: 103 MG/DL (ref 68–126)
FASTING PATIENT LIPID ANSWER: YES
FASTING STATUS PATIENT QL REPORTED: YES
GLOBULIN PLAS-MCNC: 3.6 G/DL (ref 2.8–4.4)
GLUCOSE BLD-MCNC: 90 MG/DL (ref 70–99)
HBA1C MFR BLD: 5.2 % (ref ?–5.7)
HDLC SERPL-MCNC: 76 MG/DL (ref 40–59)
LDLC SERPL CALC-MCNC: 65 MG/DL (ref ?–100)
NONHDLC SERPL-MCNC: 76 MG/DL (ref ?–130)
OSMOLALITY SERPL CALC.SUM OF ELEC: 279 MOSM/KG (ref 275–295)
POTASSIUM SERPL-SCNC: 4.2 MMOL/L (ref 3.5–5.1)
PROT SERPL-MCNC: 7.3 G/DL (ref 6.4–8.2)
SODIUM SERPL-SCNC: 134 MMOL/L (ref 136–145)
TRIGL SERPL-MCNC: 48 MG/DL (ref 30–149)
VLDLC SERPL CALC-MCNC: 7 MG/DL (ref 0–30)

## 2022-02-23 PROCEDURE — 83036 HEMOGLOBIN GLYCOSYLATED A1C: CPT

## 2022-02-23 PROCEDURE — 80061 LIPID PANEL: CPT

## 2022-02-23 PROCEDURE — 36415 COLL VENOUS BLD VENIPUNCTURE: CPT

## 2022-02-23 PROCEDURE — 80053 COMPREHEN METABOLIC PANEL: CPT

## 2022-02-23 PROCEDURE — 3044F HG A1C LEVEL LT 7.0%: CPT | Performed by: INTERNAL MEDICINE

## 2022-03-04 ENCOUNTER — OFFICE VISIT (OUTPATIENT)
Dept: INTERNAL MEDICINE CLINIC | Facility: CLINIC | Age: 67
End: 2022-03-04
Payer: MEDICARE

## 2022-03-04 VITALS
RESPIRATION RATE: 16 BRPM | SYSTOLIC BLOOD PRESSURE: 138 MMHG | HEIGHT: 70.5 IN | HEART RATE: 53 BPM | WEIGHT: 213.38 LBS | BODY MASS INDEX: 30.21 KG/M2 | OXYGEN SATURATION: 100 % | TEMPERATURE: 98 F | DIASTOLIC BLOOD PRESSURE: 60 MMHG

## 2022-03-04 DIAGNOSIS — I10 ESSENTIAL HYPERTENSION, BENIGN: Primary | ICD-10-CM

## 2022-03-04 DIAGNOSIS — E11.9 TYPE 2 DIABETES MELLITUS WITHOUT COMPLICATION, WITHOUT LONG-TERM CURRENT USE OF INSULIN (HCC): ICD-10-CM

## 2022-03-04 PROCEDURE — 3075F SYST BP GE 130 - 139MM HG: CPT | Performed by: INTERNAL MEDICINE

## 2022-03-04 PROCEDURE — 3078F DIAST BP <80 MM HG: CPT | Performed by: INTERNAL MEDICINE

## 2022-03-04 PROCEDURE — 99213 OFFICE O/P EST LOW 20 MIN: CPT | Performed by: INTERNAL MEDICINE

## 2022-03-04 PROCEDURE — 3008F BODY MASS INDEX DOCD: CPT | Performed by: INTERNAL MEDICINE

## 2022-03-04 RX ORDER — METFORMIN HYDROCHLORIDE 500 MG/1
500 TABLET, EXTENDED RELEASE ORAL 2 TIMES DAILY WITH MEALS
Qty: 180 TABLET | Refills: 3 | Status: SHIPPED | OUTPATIENT
Start: 2022-03-04 | End: 2022-06-02

## 2022-04-05 RX ORDER — LISINOPRIL AND HYDROCHLOROTHIAZIDE 20; 12.5 MG/1; MG/1
TABLET ORAL
Qty: 180 TABLET | Refills: 1 | Status: SHIPPED | OUTPATIENT
Start: 2022-04-05

## 2022-04-05 NOTE — TELEPHONE ENCOUNTER
Protocol passed     Requesting: lisinopril-hydrochlorothiazide 20-12.5mg     LOV: 3/4/22   RTC: 6 months  Filled: 11/9/21 #1801 refill   Recent Labs:2/23/22     Upcoming OV: none scheduled

## 2022-04-06 RX ORDER — METOPROLOL SUCCINATE 25 MG/1
25 TABLET, EXTENDED RELEASE ORAL DAILY
Qty: 90 TABLET | Refills: 0 | Status: SHIPPED | OUTPATIENT
Start: 2022-04-06

## 2022-04-06 RX ORDER — AMLODIPINE BESYLATE 5 MG/1
5 TABLET ORAL DAILY
Qty: 90 TABLET | Refills: 0 | Status: SHIPPED | OUTPATIENT
Start: 2022-04-06

## 2022-04-06 NOTE — TELEPHONE ENCOUNTER
Protocol passed   Requesting:   Metoprolol succinate 25mg filled 9/16/21 #90 1 refill   Amlodipine 5 mg filled 2/3/22 #90 0 refills     LOV: 3/4/22   RTC: 6 months   Recent Labs: 2/23/22     Upcoming OV: none scheduled

## 2022-04-18 RX ORDER — PRAVASTATIN SODIUM 10 MG
TABLET ORAL
Qty: 90 TABLET | Refills: 0 | Status: SHIPPED | OUTPATIENT
Start: 2022-04-18

## 2022-04-18 RX ORDER — AMLODIPINE BESYLATE 5 MG/1
TABLET ORAL
Qty: 90 TABLET | Refills: 0 | OUTPATIENT
Start: 2022-04-18

## 2022-04-18 NOTE — TELEPHONE ENCOUNTER
Protocol passed     Requesting:   Amlodipine 5mg filled 4/6/22 #90 0 refills   pravastatin 10mg filled 2/3/22 #90 0 refills     LOV: 3/4/22   RTC: 6 months  Recent Labs: 2/23/22     Upcoming OV: none scheduled

## 2022-06-20 DIAGNOSIS — I10 ESSENTIAL HYPERTENSION, BENIGN: Chronic | ICD-10-CM

## 2022-06-20 RX ORDER — METOPROLOL SUCCINATE 25 MG/1
TABLET, EXTENDED RELEASE ORAL
Qty: 90 TABLET | Refills: 0 | Status: SHIPPED | OUTPATIENT
Start: 2022-06-20

## 2022-06-24 RX ORDER — AMLODIPINE BESYLATE 5 MG/1
TABLET ORAL
Qty: 90 TABLET | Refills: 0 | Status: SHIPPED | OUTPATIENT
Start: 2022-06-24

## 2022-07-01 DIAGNOSIS — E78.00 HYPERCHOLESTEREMIA: Chronic | ICD-10-CM

## 2022-07-01 RX ORDER — PRAVASTATIN SODIUM 10 MG
TABLET ORAL
Qty: 90 TABLET | Refills: 2 | Status: SHIPPED | OUTPATIENT
Start: 2022-07-01

## 2022-08-01 DIAGNOSIS — I10 ESSENTIAL HYPERTENSION, BENIGN: Chronic | ICD-10-CM

## 2022-08-01 DIAGNOSIS — Z00.00 LABORATORY EXAMINATION ORDERED AS PART OF A ROUTINE GENERAL MEDICAL EXAMINATION: Primary | ICD-10-CM

## 2022-08-02 RX ORDER — LISINOPRIL AND HYDROCHLOROTHIAZIDE 20; 12.5 MG/1; MG/1
TABLET ORAL
Qty: 180 TABLET | Refills: 1 | Status: SHIPPED | OUTPATIENT
Start: 2022-08-02

## 2022-08-02 NOTE — TELEPHONE ENCOUNTER
SHARON scheduled with patient for 10/6   Requesting labs to be placed       Protocol passed     Requesting: lisinopril-hydrochlorothiazide     LOV: 3/4/222   RTC: 6 months   Filled: 4/5/22 #180 1 refill   Recent Labs: 2/23/22     Upcoming OV: 10/6/22 Acitretin Pregnancy And Lactation Text: This medication is Pregnancy Category X and should not be given to women who are pregnant or may become pregnant in the future. This medication is excreted in breast milk.

## 2022-09-29 ENCOUNTER — LAB ENCOUNTER (OUTPATIENT)
Dept: LAB | Age: 67
End: 2022-09-29
Attending: INTERNAL MEDICINE
Payer: MEDICARE

## 2022-09-29 DIAGNOSIS — Z00.00 LABORATORY EXAMINATION ORDERED AS PART OF A ROUTINE GENERAL MEDICAL EXAMINATION: ICD-10-CM

## 2022-09-29 LAB
ALBUMIN SERPL-MCNC: 3.5 G/DL (ref 3.4–5)
ALBUMIN/GLOB SERPL: 0.8 {RATIO} (ref 1–2)
ALP LIVER SERPL-CCNC: 58 U/L
ALT SERPL-CCNC: 25 U/L
ANION GAP SERPL CALC-SCNC: 5 MMOL/L (ref 0–18)
AST SERPL-CCNC: 20 U/L (ref 15–37)
BASOPHILS # BLD AUTO: 0.08 X10(3) UL (ref 0–0.2)
BASOPHILS NFR BLD AUTO: 1 %
BILIRUB SERPL-MCNC: 1.1 MG/DL (ref 0.1–2)
BILIRUB UR QL STRIP.AUTO: NEGATIVE
BUN BLD-MCNC: 12 MG/DL (ref 7–18)
CALCIUM BLD-MCNC: 9.7 MG/DL (ref 8.5–10.1)
CHLORIDE SERPL-SCNC: 99 MMOL/L (ref 98–112)
CHOLEST SERPL-MCNC: 151 MG/DL (ref ?–200)
CLARITY UR REFRACT.AUTO: CLEAR
CO2 SERPL-SCNC: 29 MMOL/L (ref 21–32)
COLOR UR AUTO: YELLOW
COMPLEXED PSA SERPL-MCNC: 0.71 NG/ML (ref ?–4)
CREAT BLD-MCNC: 0.85 MG/DL
EOSINOPHIL # BLD AUTO: 0.3 X10(3) UL (ref 0–0.7)
EOSINOPHIL NFR BLD AUTO: 3.8 %
ERYTHROCYTE [DISTWIDTH] IN BLOOD BY AUTOMATED COUNT: 13.9 %
EST. AVERAGE GLUCOSE BLD GHB EST-MCNC: 103 MG/DL (ref 68–126)
FASTING PATIENT LIPID ANSWER: YES
FASTING STATUS PATIENT QL REPORTED: YES
GFR SERPLBLD BASED ON 1.73 SQ M-ARVRAT: 96 ML/MIN/1.73M2 (ref 60–?)
GLOBULIN PLAS-MCNC: 4.3 G/DL (ref 2.8–4.4)
GLUCOSE BLD-MCNC: 111 MG/DL (ref 70–99)
GLUCOSE UR STRIP.AUTO-MCNC: NEGATIVE MG/DL
HBA1C MFR BLD: 5.2 % (ref ?–5.7)
HCT VFR BLD AUTO: 43.2 %
HDLC SERPL-MCNC: 81 MG/DL (ref 40–59)
HGB BLD-MCNC: 14.8 G/DL
IMM GRANULOCYTES # BLD AUTO: 0.03 X10(3) UL (ref 0–1)
IMM GRANULOCYTES NFR BLD: 0.4 %
KETONES UR STRIP.AUTO-MCNC: NEGATIVE MG/DL
LDLC SERPL CALC-MCNC: 57 MG/DL (ref ?–100)
LEUKOCYTE ESTERASE UR QL STRIP.AUTO: NEGATIVE
LYMPHOCYTES # BLD AUTO: 2.59 X10(3) UL (ref 1–4)
LYMPHOCYTES NFR BLD AUTO: 32.7 %
MCH RBC QN AUTO: 33.6 PG (ref 26–34)
MCHC RBC AUTO-ENTMCNC: 34.3 G/DL (ref 31–37)
MCV RBC AUTO: 98 FL
MONOCYTES # BLD AUTO: 0.93 X10(3) UL (ref 0.1–1)
MONOCYTES NFR BLD AUTO: 11.7 %
NEUTROPHILS # BLD AUTO: 3.99 X10 (3) UL (ref 1.5–7.7)
NEUTROPHILS # BLD AUTO: 3.99 X10(3) UL (ref 1.5–7.7)
NEUTROPHILS NFR BLD AUTO: 50.4 %
NITRITE UR QL STRIP.AUTO: NEGATIVE
NONHDLC SERPL-MCNC: 70 MG/DL (ref ?–130)
OSMOLALITY SERPL CALC.SUM OF ELEC: 276 MOSM/KG (ref 275–295)
PH UR STRIP.AUTO: 7 [PH] (ref 5–8)
PLATELET # BLD AUTO: 180 10(3)UL (ref 150–450)
POTASSIUM SERPL-SCNC: 4.6 MMOL/L (ref 3.5–5.1)
PROT SERPL-MCNC: 7.8 G/DL (ref 6.4–8.2)
RBC # BLD AUTO: 4.41 X10(6)UL
RBC UR QL AUTO: NEGATIVE
SODIUM SERPL-SCNC: 133 MMOL/L (ref 136–145)
SP GR UR STRIP.AUTO: 1.02 (ref 1–1.03)
T4 SERPL-MCNC: 7.4 UG/DL
TRIGL SERPL-MCNC: 63 MG/DL (ref 30–149)
TSI SER-ACNC: 1.82 MIU/ML (ref 0.36–3.74)
UROBILINOGEN UR STRIP.AUTO-MCNC: 0.2 MG/DL
VLDLC SERPL CALC-MCNC: 9 MG/DL (ref 0–30)
WBC # BLD AUTO: 7.9 X10(3) UL (ref 4–11)

## 2022-09-29 PROCEDURE — 81003 URINALYSIS AUTO W/O SCOPE: CPT | Performed by: INTERNAL MEDICINE

## 2022-09-29 PROCEDURE — 80053 COMPREHEN METABOLIC PANEL: CPT | Performed by: INTERNAL MEDICINE

## 2022-09-29 PROCEDURE — 85025 COMPLETE CBC W/AUTO DIFF WBC: CPT | Performed by: INTERNAL MEDICINE

## 2022-09-29 PROCEDURE — 84436 ASSAY OF TOTAL THYROXINE: CPT | Performed by: INTERNAL MEDICINE

## 2022-09-29 PROCEDURE — 83036 HEMOGLOBIN GLYCOSYLATED A1C: CPT | Performed by: INTERNAL MEDICINE

## 2022-09-29 PROCEDURE — 36415 COLL VENOUS BLD VENIPUNCTURE: CPT | Performed by: INTERNAL MEDICINE

## 2022-09-29 PROCEDURE — 84443 ASSAY THYROID STIM HORMONE: CPT | Performed by: INTERNAL MEDICINE

## 2022-09-29 PROCEDURE — 80061 LIPID PANEL: CPT | Performed by: INTERNAL MEDICINE

## 2022-10-06 ENCOUNTER — OFFICE VISIT (OUTPATIENT)
Dept: INTERNAL MEDICINE CLINIC | Facility: CLINIC | Age: 67
End: 2022-10-06
Payer: MEDICARE

## 2022-10-06 VITALS
HEART RATE: 52 BPM | DIASTOLIC BLOOD PRESSURE: 60 MMHG | HEIGHT: 70 IN | TEMPERATURE: 98 F | WEIGHT: 199.63 LBS | RESPIRATION RATE: 16 BRPM | SYSTOLIC BLOOD PRESSURE: 110 MMHG | OXYGEN SATURATION: 100 % | BODY MASS INDEX: 28.58 KG/M2

## 2022-10-06 DIAGNOSIS — G89.29 CHRONIC PAIN OF BOTH SHOULDERS: Chronic | ICD-10-CM

## 2022-10-06 DIAGNOSIS — M25.512 CHRONIC PAIN OF BOTH SHOULDERS: Chronic | ICD-10-CM

## 2022-10-06 DIAGNOSIS — M25.511 CHRONIC PAIN OF BOTH SHOULDERS: Chronic | ICD-10-CM

## 2022-10-06 DIAGNOSIS — I73.9 PAD (PERIPHERAL ARTERY DISEASE) (HCC): ICD-10-CM

## 2022-10-06 DIAGNOSIS — E78.00 HYPERCHOLESTEREMIA: Chronic | ICD-10-CM

## 2022-10-06 DIAGNOSIS — I83.893 VARICOSE VEINS OF BOTH LEGS WITH EDEMA: Chronic | ICD-10-CM

## 2022-10-06 DIAGNOSIS — I10 ESSENTIAL HYPERTENSION, BENIGN: Chronic | ICD-10-CM

## 2022-10-06 DIAGNOSIS — Z00.00 ROUTINE GENERAL MEDICAL EXAMINATION AT A HEALTH CARE FACILITY: Primary | ICD-10-CM

## 2022-10-06 PROBLEM — J41.0 SMOKERS' COUGH (HCC): Chronic | Status: RESOLVED | Noted: 2022-10-06 | Resolved: 2022-10-06

## 2022-10-06 PROBLEM — J41.0 SMOKERS' COUGH (HCC): Chronic | Status: ACTIVE | Noted: 2022-10-06

## 2022-10-06 LAB
ATRIAL RATE: 50 BPM
P AXIS: 59 DEGREES
P-R INTERVAL: 158 MS
Q-T INTERVAL: 440 MS
QRS DURATION: 86 MS
QTC CALCULATION (BEZET): 401 MS
R AXIS: 57 DEGREES
T AXIS: 43 DEGREES
VENTRICULAR RATE: 50 BPM

## 2022-10-06 RX ORDER — METFORMIN HYDROCHLORIDE 500 MG/1
500 TABLET, EXTENDED RELEASE ORAL
COMMUNITY
Start: 2022-08-02 | End: 2022-10-06

## 2022-10-06 RX ORDER — METOPROLOL SUCCINATE 25 MG/1
25 TABLET, EXTENDED RELEASE ORAL DAILY
Qty: 90 TABLET | Refills: 0 | Status: CANCELLED | OUTPATIENT
Start: 2022-10-06

## 2022-10-06 RX ORDER — AMLODIPINE BESYLATE 5 MG/1
5 TABLET ORAL DAILY
Qty: 90 TABLET | Refills: 0 | Status: SHIPPED | OUTPATIENT
Start: 2022-10-06

## 2022-11-12 ENCOUNTER — PATIENT MESSAGE (OUTPATIENT)
Dept: INTERNAL MEDICINE CLINIC | Facility: CLINIC | Age: 67
End: 2022-11-12

## 2022-11-14 NOTE — TELEPHONE ENCOUNTER
From: Mckenzie Estevez  To: Magno Vines MD  Sent: 11/12/2022 2:43 PM CST  Subject: Lab tests done on [id number]    Hi,   I received a letter for another lab test from the Dr.  This test is normally done with all my other labs. My question is was this test not in the orders on when I had labs done on 9/29/2022 or did the techs just miss this test?, andmif he really needs this now or when I see him in April.   Thank Tretha Ormond

## 2022-11-14 NOTE — TELEPHONE ENCOUNTER
VM - I cannot tell from your OV notes on 10/6/22 why the single lab was ordered to [address] [place] [address]? Please advise as pt is questioning when it needs to be done.  ty

## 2022-11-15 ENCOUNTER — PATIENT MESSAGE (OUTPATIENT)
Dept: INTERNAL MEDICINE CLINIC | Facility: CLINIC | Age: 67
End: 2022-11-15

## 2022-11-15 DIAGNOSIS — E11.9 TYPE 2 DIABETES MELLITUS WITHOUT COMPLICATION, WITHOUT LONG-TERM CURRENT USE OF INSULIN (HCC): ICD-10-CM

## 2022-11-15 DIAGNOSIS — I10 ESSENTIAL HYPERTENSION, BENIGN: Primary | ICD-10-CM

## 2022-11-15 NOTE — TELEPHONE ENCOUNTER
From: Jumana Estevez  To: Dennis Ozuna MD  Sent: 11/12/2022 2:43 PM CST  Subject: Lab tests done on [id number]    Hi,   I received a letter for another lab test from the Dr.  This test is normally done with all my other labs. My question is was this test not in the orders on when I had labs done on 9/29/2022 or did the techs just miss this test?, andmif he really needs this now or when I see him in April.   Thank Courtney Bunn

## 2022-11-15 NOTE — TELEPHONE ENCOUNTER
I assume someone screwed up and one of the MA'S or whoever forgot put it in   This is what happens when you change MA's working with an MD daily  Lack of consistency is problem in this office   He can do it in the next visit -no urgency

## 2022-11-16 DIAGNOSIS — I10 ESSENTIAL HYPERTENSION, BENIGN: Chronic | ICD-10-CM

## 2022-11-16 RX ORDER — AMLODIPINE BESYLATE 5 MG/1
TABLET ORAL
Qty: 90 TABLET | Refills: 0 | Status: SHIPPED | OUTPATIENT
Start: 2022-11-16

## 2022-11-17 RX ORDER — METOPROLOL SUCCINATE 25 MG/1
TABLET, EXTENDED RELEASE ORAL
Qty: 90 TABLET | Refills: 0 | Status: SHIPPED | OUTPATIENT
Start: 2022-11-17

## 2022-12-27 RX ORDER — METFORMIN HYDROCHLORIDE 500 MG/1
TABLET, EXTENDED RELEASE ORAL
Qty: 180 TABLET | Refills: 0 | Status: SHIPPED | OUTPATIENT
Start: 2022-12-27

## 2022-12-27 NOTE — TELEPHONE ENCOUNTER
Protocol failed     Requesting: metformin ER 500mg     LOV: 10/6/22   RTC: 6 months   Filled: 8/2/22 # 180   Recent Labs: 9/29/22    Upcoming OV: none scheduled

## 2023-04-05 DIAGNOSIS — I10 ESSENTIAL HYPERTENSION, BENIGN: Chronic | ICD-10-CM

## 2023-04-05 DIAGNOSIS — E78.00 HYPERCHOLESTEREMIA: Primary | Chronic | ICD-10-CM

## 2023-04-06 RX ORDER — METOPROLOL SUCCINATE 25 MG/1
TABLET, EXTENDED RELEASE ORAL
Qty: 90 TABLET | Refills: 0 | Status: SHIPPED | OUTPATIENT
Start: 2023-04-06 | End: 2023-04-10

## 2023-04-06 RX ORDER — LISINOPRIL AND HYDROCHLOROTHIAZIDE 20; 12.5 MG/1; MG/1
TABLET ORAL
Qty: 180 TABLET | Refills: 1 | Status: SHIPPED | OUTPATIENT
Start: 2023-04-06

## 2023-04-06 NOTE — TELEPHONE ENCOUNTER
Protocol failed     Requesting:   Metoprolol succinate 100mg filled 11/17/22 # 90 0 refills   Lisinopril-hydrochlorothiazide 20-12.5mg filled 8/2/22 # 180 1 refills     LOV:10/6/22   RTC: 6 months   Recent Labs: 9/29/22     Upcoming OV: 4/11/23

## 2023-04-07 ENCOUNTER — LAB ENCOUNTER (OUTPATIENT)
Dept: LAB | Age: 68
End: 2023-04-07
Attending: INTERNAL MEDICINE
Payer: MEDICARE

## 2023-04-07 DIAGNOSIS — E78.00 HYPERCHOLESTEREMIA: Chronic | ICD-10-CM

## 2023-04-07 DIAGNOSIS — I10 ESSENTIAL HYPERTENSION, BENIGN: Chronic | ICD-10-CM

## 2023-04-07 LAB
ALBUMIN SERPL-MCNC: 3.6 G/DL (ref 3.4–5)
ALBUMIN/GLOB SERPL: 0.9 {RATIO} (ref 1–2)
ALP LIVER SERPL-CCNC: 69 U/L
ALT SERPL-CCNC: 36 U/L
ANION GAP SERPL CALC-SCNC: 6 MMOL/L (ref 0–18)
AST SERPL-CCNC: 27 U/L (ref 15–37)
BILIRUB SERPL-MCNC: 0.8 MG/DL (ref 0.1–2)
BUN BLD-MCNC: 17 MG/DL (ref 7–18)
CALCIUM BLD-MCNC: 9.5 MG/DL (ref 8.5–10.1)
CHLORIDE SERPL-SCNC: 99 MMOL/L (ref 98–112)
CHOLEST SERPL-MCNC: 165 MG/DL (ref ?–200)
CO2 SERPL-SCNC: 28 MMOL/L (ref 21–32)
CREAT BLD-MCNC: 0.78 MG/DL
EST. AVERAGE GLUCOSE BLD GHB EST-MCNC: 97 MG/DL (ref 68–126)
FASTING PATIENT LIPID ANSWER: YES
FASTING STATUS PATIENT QL REPORTED: YES
GFR SERPLBLD BASED ON 1.73 SQ M-ARVRAT: 98 ML/MIN/1.73M2 (ref 60–?)
GLOBULIN PLAS-MCNC: 3.9 G/DL (ref 2.8–4.4)
GLUCOSE BLD-MCNC: 86 MG/DL (ref 70–99)
HBA1C MFR BLD: 5 % (ref ?–5.7)
HDLC SERPL-MCNC: 87 MG/DL (ref 40–59)
LDLC SERPL CALC-MCNC: 66 MG/DL (ref ?–100)
NONHDLC SERPL-MCNC: 78 MG/DL (ref ?–130)
OSMOLALITY SERPL CALC.SUM OF ELEC: 277 MOSM/KG (ref 275–295)
POTASSIUM SERPL-SCNC: 4.9 MMOL/L (ref 3.5–5.1)
PROT SERPL-MCNC: 7.5 G/DL (ref 6.4–8.2)
SODIUM SERPL-SCNC: 133 MMOL/L (ref 136–145)
TRIGL SERPL-MCNC: 58 MG/DL (ref 30–149)
VLDLC SERPL CALC-MCNC: 9 MG/DL (ref 0–30)

## 2023-04-07 PROCEDURE — 80053 COMPREHEN METABOLIC PANEL: CPT

## 2023-04-07 PROCEDURE — 83036 HEMOGLOBIN GLYCOSYLATED A1C: CPT

## 2023-04-07 PROCEDURE — 80061 LIPID PANEL: CPT

## 2023-04-07 PROCEDURE — 36415 COLL VENOUS BLD VENIPUNCTURE: CPT

## 2023-04-07 PROCEDURE — 3044F HG A1C LEVEL LT 7.0%: CPT | Performed by: INTERNAL MEDICINE

## 2023-04-08 ENCOUNTER — PATIENT MESSAGE (OUTPATIENT)
Dept: INTERNAL MEDICINE CLINIC | Facility: CLINIC | Age: 68
End: 2023-04-08

## 2023-04-11 ENCOUNTER — OFFICE VISIT (OUTPATIENT)
Dept: INTERNAL MEDICINE CLINIC | Facility: CLINIC | Age: 68
End: 2023-04-11
Payer: MEDICARE

## 2023-04-11 VITALS
SYSTOLIC BLOOD PRESSURE: 122 MMHG | BODY MASS INDEX: 27.8 KG/M2 | RESPIRATION RATE: 16 BRPM | HEART RATE: 65 BPM | DIASTOLIC BLOOD PRESSURE: 60 MMHG | OXYGEN SATURATION: 100 % | WEIGHT: 194.19 LBS | TEMPERATURE: 98 F | HEIGHT: 70 IN

## 2023-04-11 DIAGNOSIS — I10 ESSENTIAL HYPERTENSION, BENIGN: Primary | Chronic | ICD-10-CM

## 2023-04-11 PROCEDURE — 3008F BODY MASS INDEX DOCD: CPT | Performed by: INTERNAL MEDICINE

## 2023-04-11 PROCEDURE — 3078F DIAST BP <80 MM HG: CPT | Performed by: INTERNAL MEDICINE

## 2023-04-11 PROCEDURE — 99213 OFFICE O/P EST LOW 20 MIN: CPT | Performed by: INTERNAL MEDICINE

## 2023-04-11 PROCEDURE — 3074F SYST BP LT 130 MM HG: CPT | Performed by: INTERNAL MEDICINE

## 2023-04-11 RX ORDER — MOMETASONE FUROATE 1 MG/G
1 CREAM TOPICAL 2 TIMES DAILY PRN
Qty: 60 G | Refills: 3 | Status: SHIPPED | OUTPATIENT
Start: 2023-04-11

## 2023-04-22 DIAGNOSIS — E78.00 HYPERCHOLESTEREMIA: Chronic | ICD-10-CM

## 2023-04-24 RX ORDER — PRAVASTATIN SODIUM 10 MG
TABLET ORAL
Qty: 90 TABLET | Refills: 2 | Status: SHIPPED | OUTPATIENT
Start: 2023-04-24

## 2023-08-10 LAB — AMB EXT OCCULT BLOOD RESULT: NEGATIVE

## 2023-08-28 ENCOUNTER — TELEPHONE (OUTPATIENT)
Dept: INTERNAL MEDICINE CLINIC | Facility: CLINIC | Age: 68
End: 2023-08-28

## 2023-08-31 DIAGNOSIS — I10 ESSENTIAL HYPERTENSION, BENIGN: Chronic | ICD-10-CM

## 2023-08-31 RX ORDER — AMLODIPINE BESYLATE 5 MG/1
TABLET ORAL
Qty: 90 TABLET | Refills: 0 | Status: SHIPPED | OUTPATIENT
Start: 2023-08-31

## 2023-11-12 DIAGNOSIS — I10 ESSENTIAL HYPERTENSION, BENIGN: Chronic | ICD-10-CM

## 2023-11-14 ENCOUNTER — TELEPHONE (OUTPATIENT)
Dept: INTERNAL MEDICINE CLINIC | Facility: CLINIC | Age: 68
End: 2023-11-14

## 2023-11-14 DIAGNOSIS — E11.9 TYPE 2 DIABETES MELLITUS WITHOUT COMPLICATION, WITHOUT LONG-TERM CURRENT USE OF INSULIN (HCC): ICD-10-CM

## 2023-11-14 DIAGNOSIS — E78.00 HYPERCHOLESTEREMIA: ICD-10-CM

## 2023-11-14 DIAGNOSIS — Z00.00 LABORATORY EXAMINATION ORDERED AS PART OF A ROUTINE GENERAL MEDICAL EXAMINATION: Primary | ICD-10-CM

## 2023-11-14 DIAGNOSIS — I10 ESSENTIAL HYPERTENSION, BENIGN: ICD-10-CM

## 2023-11-14 RX ORDER — LISINOPRIL AND HYDROCHLOROTHIAZIDE 20; 12.5 MG/1; MG/1
TABLET ORAL
Qty: 180 TABLET | Refills: 10 | Status: SHIPPED | OUTPATIENT
Start: 2023-11-14

## 2023-11-14 NOTE — TELEPHONE ENCOUNTER
Appt scheduled. Future Appointments   Date Time Provider Dmitri Martin   11/28/2023  3:30 PM Jayda Ochoa MD [facility] [place]     Patient is requesting lab orders for CPX.

## 2023-11-21 ENCOUNTER — LAB ENCOUNTER (OUTPATIENT)
Dept: LAB | Age: 68
End: 2023-11-21
Attending: INTERNAL MEDICINE
Payer: MEDICARE

## 2023-11-21 DIAGNOSIS — E78.00 HYPERCHOLESTEREMIA: ICD-10-CM

## 2023-11-21 DIAGNOSIS — I10 ESSENTIAL HYPERTENSION, BENIGN: ICD-10-CM

## 2023-11-21 DIAGNOSIS — Z00.00 LABORATORY EXAMINATION ORDERED AS PART OF A ROUTINE GENERAL MEDICAL EXAMINATION: ICD-10-CM

## 2023-11-21 DIAGNOSIS — E11.9 TYPE 2 DIABETES MELLITUS WITHOUT COMPLICATION, WITHOUT LONG-TERM CURRENT USE OF INSULIN (HCC): ICD-10-CM

## 2023-11-21 LAB
ALBUMIN SERPL-MCNC: 3.6 G/DL (ref 3.4–5)
ALBUMIN/GLOB SERPL: 0.8 {RATIO} (ref 1–2)
ALP LIVER SERPL-CCNC: 58 U/L
ALT SERPL-CCNC: 20 U/L
ANION GAP SERPL CALC-SCNC: 6 MMOL/L (ref 0–18)
AST SERPL-CCNC: 16 U/L (ref 15–37)
BASOPHILS # BLD AUTO: 0.1 X10(3) UL (ref 0–0.2)
BASOPHILS NFR BLD AUTO: 1.3 %
BILIRUB SERPL-MCNC: 1 MG/DL (ref 0.1–2)
BILIRUB UR QL STRIP.AUTO: NEGATIVE
BUN BLD-MCNC: 16 MG/DL (ref 9–23)
CALCIUM BLD-MCNC: 9.7 MG/DL (ref 8.5–10.1)
CHLORIDE SERPL-SCNC: 100 MMOL/L (ref 98–112)
CHOLEST SERPL-MCNC: 147 MG/DL (ref ?–200)
CLARITY UR REFRACT.AUTO: CLEAR
CO2 SERPL-SCNC: 26 MMOL/L (ref 21–32)
COLOR UR AUTO: YELLOW
COMPLEXED PSA SERPL-MCNC: 2.17 NG/ML (ref ?–4)
CREAT BLD-MCNC: 0.9 MG/DL
CREAT UR-SCNC: 42.3 MG/DL
EGFRCR SERPLBLD CKD-EPI 2021: 93 ML/MIN/1.73M2 (ref 60–?)
EOSINOPHIL # BLD AUTO: 0.24 X10(3) UL (ref 0–0.7)
EOSINOPHIL NFR BLD AUTO: 3.1 %
ERYTHROCYTE [DISTWIDTH] IN BLOOD BY AUTOMATED COUNT: 13.4 %
EST. AVERAGE GLUCOSE BLD GHB EST-MCNC: 108 MG/DL (ref 68–126)
FASTING PATIENT LIPID ANSWER: YES
FASTING STATUS PATIENT QL REPORTED: YES
GLOBULIN PLAS-MCNC: 4.5 G/DL (ref 2.8–4.4)
GLUCOSE BLD-MCNC: 107 MG/DL (ref 70–99)
GLUCOSE UR STRIP.AUTO-MCNC: NORMAL MG/DL
HBA1C MFR BLD: 5.4 % (ref ?–5.7)
HCT VFR BLD AUTO: 41.2 %
HDLC SERPL-MCNC: 76 MG/DL (ref 40–59)
HGB BLD-MCNC: 14.3 G/DL
IMM GRANULOCYTES # BLD AUTO: 0.03 X10(3) UL (ref 0–1)
IMM GRANULOCYTES NFR BLD: 0.4 %
KETONES UR STRIP.AUTO-MCNC: NEGATIVE MG/DL
LDLC SERPL CALC-MCNC: 60 MG/DL (ref ?–100)
LEUKOCYTE ESTERASE UR QL STRIP.AUTO: 500
LYMPHOCYTES # BLD AUTO: 2.83 X10(3) UL (ref 1–4)
LYMPHOCYTES NFR BLD AUTO: 36.8 %
MCH RBC QN AUTO: 32.4 PG (ref 26–34)
MCHC RBC AUTO-ENTMCNC: 34.7 G/DL (ref 31–37)
MCV RBC AUTO: 93.4 FL
MICROALBUMIN UR-MCNC: 4.03 MG/DL
MICROALBUMIN/CREAT 24H UR-RTO: 95.3 UG/MG (ref ?–30)
MONOCYTES # BLD AUTO: 0.88 X10(3) UL (ref 0.1–1)
MONOCYTES NFR BLD AUTO: 11.4 %
NEUTROPHILS # BLD AUTO: 3.62 X10 (3) UL (ref 1.5–7.7)
NEUTROPHILS # BLD AUTO: 3.62 X10(3) UL (ref 1.5–7.7)
NEUTROPHILS NFR BLD AUTO: 47 %
NITRITE UR QL STRIP.AUTO: NEGATIVE
NONHDLC SERPL-MCNC: 71 MG/DL (ref ?–130)
OSMOLALITY SERPL CALC.SUM OF ELEC: 276 MOSM/KG (ref 275–295)
PH UR STRIP.AUTO: 7 [PH] (ref 5–8)
PLATELET # BLD AUTO: 193 10(3)UL (ref 150–450)
POTASSIUM SERPL-SCNC: 4.6 MMOL/L (ref 3.5–5.1)
PROT SERPL-MCNC: 8.1 G/DL (ref 6.4–8.2)
PROT UR STRIP.AUTO-MCNC: NEGATIVE MG/DL
RBC # BLD AUTO: 4.41 X10(6)UL
RBC UR QL AUTO: NEGATIVE
SODIUM SERPL-SCNC: 132 MMOL/L (ref 136–145)
SP GR UR STRIP.AUTO: 1.01 (ref 1–1.03)
T4 SERPL-MCNC: 6.8 UG/DL
TRIGL SERPL-MCNC: 53 MG/DL (ref 30–149)
TSI SER-ACNC: 1.83 MIU/ML (ref 0.36–3.74)
UROBILINOGEN UR STRIP.AUTO-MCNC: NORMAL MG/DL
VLDLC SERPL CALC-MCNC: 8 MG/DL (ref 0–30)
WBC # BLD AUTO: 7.7 X10(3) UL (ref 4–11)
WBC #/AREA URNS AUTO: >50 /HPF
WBC CLUMPS UR QL AUTO: PRESENT /HPF

## 2023-11-21 PROCEDURE — 84436 ASSAY OF TOTAL THYROXINE: CPT

## 2023-11-21 PROCEDURE — 81001 URINALYSIS AUTO W/SCOPE: CPT

## 2023-11-21 PROCEDURE — 84443 ASSAY THYROID STIM HORMONE: CPT

## 2023-11-21 PROCEDURE — 36415 COLL VENOUS BLD VENIPUNCTURE: CPT

## 2023-11-21 PROCEDURE — 80061 LIPID PANEL: CPT

## 2023-11-21 PROCEDURE — 80053 COMPREHEN METABOLIC PANEL: CPT

## 2023-11-21 PROCEDURE — 85025 COMPLETE CBC W/AUTO DIFF WBC: CPT

## 2023-11-21 PROCEDURE — 82570 ASSAY OF URINE CREATININE: CPT

## 2023-11-21 PROCEDURE — 83036 HEMOGLOBIN GLYCOSYLATED A1C: CPT

## 2023-11-21 PROCEDURE — 82043 UR ALBUMIN QUANTITATIVE: CPT

## 2023-12-01 ENCOUNTER — OFFICE VISIT (OUTPATIENT)
Dept: INTERNAL MEDICINE CLINIC | Facility: CLINIC | Age: 68
End: 2023-12-01
Payer: MEDICARE

## 2023-12-01 ENCOUNTER — LAB ENCOUNTER (OUTPATIENT)
Dept: LAB | Age: 68
End: 2023-12-01
Attending: INTERNAL MEDICINE
Payer: MEDICARE

## 2023-12-01 VITALS
OXYGEN SATURATION: 100 % | SYSTOLIC BLOOD PRESSURE: 124 MMHG | HEIGHT: 70.13 IN | BODY MASS INDEX: 29.75 KG/M2 | WEIGHT: 207.81 LBS | RESPIRATION RATE: 16 BRPM | HEART RATE: 60 BPM | DIASTOLIC BLOOD PRESSURE: 56 MMHG | TEMPERATURE: 98 F

## 2023-12-01 DIAGNOSIS — E78.00 HYPERCHOLESTEREMIA: Chronic | ICD-10-CM

## 2023-12-01 DIAGNOSIS — M25.512 CHRONIC PAIN OF BOTH SHOULDERS: Chronic | ICD-10-CM

## 2023-12-01 DIAGNOSIS — I10 ESSENTIAL HYPERTENSION, BENIGN: Chronic | ICD-10-CM

## 2023-12-01 DIAGNOSIS — G89.29 CHRONIC PAIN OF BOTH SHOULDERS: Chronic | ICD-10-CM

## 2023-12-01 DIAGNOSIS — Z87.891 EX-SMOKER: ICD-10-CM

## 2023-12-01 DIAGNOSIS — R82.90 ABNORMAL URINE: ICD-10-CM

## 2023-12-01 DIAGNOSIS — Z00.00 ROUTINE GENERAL MEDICAL EXAMINATION AT A HEALTH CARE FACILITY: Primary | ICD-10-CM

## 2023-12-01 DIAGNOSIS — M25.511 CHRONIC PAIN OF BOTH SHOULDERS: Chronic | ICD-10-CM

## 2023-12-01 DIAGNOSIS — I83.893 VARICOSE VEINS OF BOTH LEGS WITH EDEMA: Chronic | ICD-10-CM

## 2023-12-01 LAB
ATRIAL RATE: 61 BPM
BILIRUB UR QL STRIP.AUTO: NEGATIVE
CLARITY UR REFRACT.AUTO: CLEAR
GLUCOSE UR STRIP.AUTO-MCNC: NORMAL MG/DL
KETONES UR STRIP.AUTO-MCNC: NEGATIVE MG/DL
LEUKOCYTE ESTERASE UR QL STRIP.AUTO: 500
NITRITE UR QL STRIP.AUTO: NEGATIVE
P AXIS: 71 DEGREES
P-R INTERVAL: 162 MS
PH UR STRIP.AUTO: 6.5 [PH] (ref 5–8)
PROT UR STRIP.AUTO-MCNC: NEGATIVE MG/DL
Q-T INTERVAL: 442 MS
QRS DURATION: 76 MS
QTC CALCULATION (BEZET): 444 MS
R AXIS: 61 DEGREES
RBC UR QL AUTO: NEGATIVE
SP GR UR STRIP.AUTO: 1.01 (ref 1–1.03)
T AXIS: 54 DEGREES
UROBILINOGEN UR STRIP.AUTO-MCNC: NORMAL MG/DL
VENTRICULAR RATE: 61 BPM
WBC #/AREA URNS AUTO: >50 /HPF

## 2023-12-01 PROCEDURE — 87077 CULTURE AEROBIC IDENTIFY: CPT

## 2023-12-01 PROCEDURE — 81001 URINALYSIS AUTO W/SCOPE: CPT

## 2023-12-01 PROCEDURE — 87086 URINE CULTURE/COLONY COUNT: CPT

## 2023-12-02 ENCOUNTER — HOSPITAL ENCOUNTER (OUTPATIENT)
Dept: GENERAL RADIOLOGY | Age: 68
Discharge: HOME | End: 2023-12-02
Attending: INTERNAL MEDICINE
Payer: MEDICARE

## 2023-12-02 DIAGNOSIS — Z87.891 EX-SMOKER: ICD-10-CM

## 2023-12-02 PROCEDURE — 71046 X-RAY EXAM CHEST 2 VIEWS: CPT | Performed by: INTERNAL MEDICINE

## 2023-12-04 DIAGNOSIS — R82.90 ABNORMAL URINE: Primary | ICD-10-CM

## 2023-12-04 DIAGNOSIS — R93.89 ABNORMAL CHEST X-RAY: Primary | ICD-10-CM

## 2023-12-04 RX ORDER — AMOXICILLIN 500 MG/1
500 CAPSULE ORAL 3 TIMES DAILY
Qty: 30 CAPSULE | Refills: 0 | Status: SHIPPED | OUTPATIENT
Start: 2023-12-04 | End: 2023-12-14

## 2023-12-10 ENCOUNTER — HOSPITAL ENCOUNTER (OUTPATIENT)
Dept: CT IMAGING | Age: 68
Discharge: HOME | End: 2023-12-10
Attending: INTERNAL MEDICINE
Payer: MEDICARE

## 2023-12-10 ENCOUNTER — HOSPITAL ENCOUNTER (OUTPATIENT)
Dept: CT IMAGING | Age: 68
End: 2023-12-10
Attending: INTERNAL MEDICINE
Payer: MEDICARE

## 2023-12-10 DIAGNOSIS — R93.89 ABNORMAL CHEST X-RAY: ICD-10-CM

## 2023-12-10 PROCEDURE — 71250 CT THORAX DX C-: CPT | Performed by: INTERNAL MEDICINE

## 2023-12-11 DIAGNOSIS — I73.9 PAD (PERIPHERAL ARTERY DISEASE) (HCC): ICD-10-CM

## 2023-12-11 DIAGNOSIS — R93.89 ABNORMAL CT OF THE CHEST: Primary | ICD-10-CM

## 2023-12-11 DIAGNOSIS — I25.10 CORONARY ARTERY CALCIFICATION: Primary | ICD-10-CM

## 2023-12-11 DIAGNOSIS — I25.84 CORONARY ARTERY CALCIFICATION: Primary | ICD-10-CM

## 2023-12-11 DIAGNOSIS — Z87.891 EX-SMOKER: ICD-10-CM

## 2023-12-11 PROBLEM — K80.20 GALL STONES: Status: ACTIVE | Noted: 2023-12-11

## 2023-12-30 ENCOUNTER — LAB ENCOUNTER (OUTPATIENT)
Dept: LAB | Age: 68
End: 2023-12-30
Attending: INTERNAL MEDICINE
Payer: MEDICARE

## 2023-12-30 DIAGNOSIS — R82.90 ABNORMAL URINE: ICD-10-CM

## 2023-12-30 LAB
BILIRUB UR QL STRIP.AUTO: NEGATIVE
CLARITY UR REFRACT.AUTO: CLEAR
GLUCOSE UR STRIP.AUTO-MCNC: NORMAL MG/DL
KETONES UR STRIP.AUTO-MCNC: NEGATIVE MG/DL
LEUKOCYTE ESTERASE UR QL STRIP.AUTO: NEGATIVE
NITRITE UR QL STRIP.AUTO: NEGATIVE
PH UR STRIP.AUTO: 6 [PH] (ref 5–8)
PROT UR STRIP.AUTO-MCNC: NEGATIVE MG/DL
RBC UR QL AUTO: NEGATIVE
SP GR UR STRIP.AUTO: 1.01 (ref 1–1.03)
UROBILINOGEN UR STRIP.AUTO-MCNC: NORMAL MG/DL

## 2023-12-30 PROCEDURE — 87086 URINE CULTURE/COLONY COUNT: CPT

## 2023-12-30 PROCEDURE — 81003 URINALYSIS AUTO W/O SCOPE: CPT

## 2024-01-26 DIAGNOSIS — I10 ESSENTIAL HYPERTENSION, BENIGN: Chronic | ICD-10-CM

## 2024-01-26 RX ORDER — AMLODIPINE BESYLATE 5 MG/1
5 TABLET ORAL DAILY
Qty: 90 TABLET | Refills: 0 | Status: SHIPPED | OUTPATIENT
Start: 2024-01-26

## 2024-01-26 NOTE — TELEPHONE ENCOUNTER
Name from pharmacy: AMLODIPINE BESYLATE 5 MG Tablet         Will file in chart as: AMLODIPINE 5 MG Oral Tab    Sig: TAKE 1 TABLET EVERY DAY    Disp: 90 tablet    Refills: 3    Start: 1/26/2024    Class: Normal    Non-formulary For: Essential hypertension, benign    Last ordered: 4 months ago (8/31/2023) by Hima Benjamin MD    Last refill: 11/14/2023    Rx #: [id number]    Hypertension Medications Protocol [id number]/26/2024 01:38 AM   Protocol Details CMP or BMP in past 12 months    Last serum creatinine< 2.0    Appointment in past 6 or next 3 months      To be filled at: [facility] - [facility] [address] [phone], [phone]

## 2024-02-07 DIAGNOSIS — E78.00 HYPERCHOLESTEREMIA: Chronic | ICD-10-CM

## 2024-02-08 RX ORDER — PRAVASTATIN SODIUM 10 MG
TABLET ORAL
Qty: 90 TABLET | Refills: 1 | Status: SHIPPED | OUTPATIENT
Start: 2024-02-08

## 2024-02-08 NOTE — TELEPHONE ENCOUNTER
LOV: 12/1/23   RTC: 6 months   Filled: 4/24/23 # 90 2 refill   Labs: 11/21/23   Future Appointments   Date Time Provider Department Center   2/12/2024 11:30 AM Nehemiah Yadav MD  [facility]

## 2024-02-12 ENCOUNTER — OFFICE VISIT (OUTPATIENT)
Facility: CLINIC | Age: 69
End: 2024-02-12
Payer: MEDICARE

## 2024-02-12 VITALS
HEART RATE: 65 BPM | RESPIRATION RATE: 16 BRPM | SYSTOLIC BLOOD PRESSURE: 150 MMHG | WEIGHT: 207 LBS | OXYGEN SATURATION: 100 % | BODY MASS INDEX: 29.63 KG/M2 | HEIGHT: 70 IN | DIASTOLIC BLOOD PRESSURE: 62 MMHG

## 2024-02-12 DIAGNOSIS — R91.1 PULMONARY NODULE: Primary | ICD-10-CM

## 2024-02-12 DIAGNOSIS — J84.10 PULMONARY FIBROSIS (HCC): ICD-10-CM

## 2024-02-12 DIAGNOSIS — J43.2 CENTRILOBULAR EMPHYSEMA (HCC): ICD-10-CM

## 2024-02-12 DIAGNOSIS — R59.0 THORACIC LYMPHADENOPATHY: ICD-10-CM

## 2024-02-12 PROCEDURE — 1159F MED LIST DOCD IN RCRD: CPT | Performed by: INTERNAL MEDICINE

## 2024-02-12 PROCEDURE — 99204 OFFICE O/P NEW MOD 45 MIN: CPT | Performed by: INTERNAL MEDICINE

## 2024-02-12 PROCEDURE — 3078F DIAST BP <80 MM HG: CPT | Performed by: INTERNAL MEDICINE

## 2024-02-12 PROCEDURE — 1160F RVW MEDS BY RX/DR IN RCRD: CPT | Performed by: INTERNAL MEDICINE

## 2024-02-12 PROCEDURE — 3008F BODY MASS INDEX DOCD: CPT | Performed by: INTERNAL MEDICINE

## 2024-02-12 PROCEDURE — 3077F SYST BP >= 140 MM HG: CPT | Performed by: INTERNAL MEDICINE

## 2024-02-12 NOTE — H&P
[facility] General Pulmonary Consult Note    History of Present Illness:  Pato Estevez is a 68 year old male smoker with significant PMH of DM, HTN who presents today for evaluation of abnormal chest imaging. He reports having a follow up with his PCP and had a screening CXR which had some concern for an opacity in the left lower lung. He had a CT chest showing emphysema, subpleural fibrosis and a RUL nodule leading to his evaluation here. He admits he was stressed about the results so he waited on coming to clinic until now. He denies new concerns at present. No cough, dyspnea, wheeze, chest pain/pressure, f/c/s.     Past Medical History:   Past Medical History:   Diagnosis Date    Back problem     DJD (degenerative joint disease) of lumbar spine     High blood pressure     Other and unspecified hyperlipidemia     Rotator cuff disorder     LT    Type II or unspecified type diabetes mellitus without mention of complication, not stated as uncontrolled     Unspecified essential hypertension     Varicose vein     Visual impairment     reading glasses        Past Surgical History:   Past Surgical History:   Procedure Laterality Date    COLONOSCOPY N/A 10/14/2015    Procedure: COLONOSCOPY;  Surgeon: Abundio Randall MD;  Location:  ENDOSCOPY    OTHER Right 8/25/2015    endo luminal laser vein ablation    OTHER Left 9/18/2015    endo luminal laser vein ablation    OTHER SURGICAL HISTORY      para    OTHER SURGICAL HISTORY  2000    vericose vein removal     TONSILLECTOMY  as a child    VASECTOMY  1984       Family Medical History:   Family History   Problem Relation Age of Onset    Hypertension Father         Social History:   Social History     Socioeconomic History    Marital status:      Spouse name: Not on file    Number of children: Not on file    Years of education: Not on file    Highest education level: Not on file   Occupational History    Not on file   Tobacco Use    Smoking status: Some Days      Packs/day: 0.00     Years: 40.00     Additional pack years: 0.00     Total pack years: 0.00     Types: Cigarettes, Cigars     Start date:      Last attempt to quit: 2019     Years since quittin.1    Smokeless tobacco: Never    Tobacco comments:     Relapsed in [id number] smokes about every other day   Vaping Use    Vaping Use: Never used   Substance and Sexual Activity    Alcohol use: Yes     Comment: 2023: \"A few beers a day\"    Drug use: Yes     Types: Cannabis     Comment: Rarely    Sexual activity: Not on file   Other Topics Concern    Caffeine Concern Yes     Comment: At least 4 cups of coffee daily    Exercise No    Seat Belt No    Special Diet No    Stress Concern No    Weight Concern No   Social History Narrative    Not on file     Social Determinants of Health     Financial Resource Strain: Not on file   Food Insecurity: Not on file   Transportation Needs: Not on file   Physical Activity: Not on file   Stress: Not on file   Social Connections: Not on file   Housing Stability: Not on file        Allergies: Patient has no known allergies.     Medications:   Current Outpatient Medications   Medication Sig Dispense Refill    pravastatin 10 MG Oral Tab TAKE 1 TABLET EVERY DAY 90 tablet 1    amLODIPine 5 MG Oral Tab Take 1 tablet (5 mg total) by mouth daily. 90 tablet 0    LISINOPRIL-HYDROCHLOROTHIAZIDE 20-12.5 MG Oral Tab TAKE 2 TABLETS EVERY DAY (Patient taking differently: Take 1 tablet by mouth 2 (two) times daily.) 180 tablet 10    Mometasone Furoate 0.1 % External Cream Apply 1 Application. topically 2 (two) times daily as needed. 60 g 3    aspirin (ASPIRIN LOW DOSE) 81 MG Oral Tab EC Take 1 tablet (81 mg total) by mouth once daily. 90 tablet 1    naproxen 220 MG Oral Tab Take 1-2 tablets (220-440 mg total) by mouth 2 (two) times daily as needed.         Review of Systems: Review of Systems   Constitutional: Negative.    HENT: Negative.     Respiratory: Negative.     Cardiovascular:  Negative.    All other systems reviewed and are negative.      Physical Exam:  /62 (BP Location: Left arm, Patient Position: Sitting, Cuff Size: adult)   Pulse 65   Resp 16   Ht 5' 10\" (1.778 m)   Wt 207 lb (93.9 kg)   SpO2 100%   BMI 29.70 kg/m²      Constitutional: alert, cooperative. No acute distress.  HEENT: Head NC/AT.   Cardio: Regular rate and rhythm. Normal S1 and S2. No murmurs.   Respiratory: Thorax symmetrical with no labored breathing. Clear to ausculation bilaterally with symmetrical breath sounds. No wheezing, rhonchi, rales, or crackles.   GI: NABS. Abd soft, non-tender.  Extremities: No clubbing or cyanosis. No BLE edema.    Neurologic: A&Ox3. No gross motor deficits.  Skin: Warm, dry  Psych: Calm, cooperative. Pleasant affect.    Results:  Personally reviewed  WBC: 7.7, done on 11/21/2023.  HGB: 14.3, done on 11/21/2023.  PLT: 193, done on 11/21/2023.     Glucose: 107, done on 11/21/2023.  Cr: 0.9, done on 11/21/2023.  GFR(AA): 101, done on 2/23/2022.  GFR (non-AA): 88, done on 2/23/2022.  CA: 9.7, done on 11/21/2023.  Na: 132, done on 11/21/2023.  K: 4.6, done on 11/21/2023.  Cl: 100, done on 11/21/2023.  CO2: 26, done on 11/21/2023.  Last ALB was 3.6% done on 11/21/2023.     CT CHEST (CPT=71250)    Result Date: 12/10/2023  CONCLUSION:   1. Irregular nodular airspace opacity anteriorly in the right upper lobe measuring 11 x 8 mm is most likely related to post infectious/inflammatory process versus scarring.  Possibility of neoplastic disease is not excluded especially given the patient's  history of smoking and finding of mild emphysematous change.  Therefore, follow-up with PET-CT or 3 month low-dose chest CT would be recommended.  2. Mild subpleural interstitial fibrotic change within the lower lobes.  3. Cholelithiasis.  LOCATION:  [facility]   Dictated by (CST): Dorian Mata MD on 12/10/2023 at 11:19 AM     Finalized by (CST): Dorian Mata MD on 12/10/2023 at 11:25 AM       XR CHEST  PA + LAT CHEST (CPT=71046)    Result Date: 12/2/2023  CONCLUSION:  1. Hyperexpansion of the lungs.  2. Possible subtle opacity within mid to lower lung zone.  This could be secondary to overlying soft tissue but atelectasis or infiltrates cannot be excluded.   LOCATION:  [facility]   Dictated by (CST): Scott Jones MD on 12/02/2023 at 10:39 AM     Finalized by (CST): Scott Jones MD on 12/02/2023 at 10:40 AM         Assessment/Plan:  #1. Lung nodule  Found incidentally on CT imaging  12/2023 CT chest with RUL 11x8mm nodule with BUL emphysema and bilateral subpleural reticular opacitites. +mediastinal/hilar LAD  We reviewed his imaging in detail including differential diagnoses and management. We discussed the options in detail: CT chest in 2-3 months (Feb-March 2024), PET/CT now vs biopsies.  At this time (two months since Dec 2023), I recommended he proceed with CT chest to reassess the nodule and LAD. He is agreeable and will obtain testing    #2. emphysema  +smoker, 40 pack years  Noted on imaging  Asymptomatic status, no need for inhalers at present  Obtain PFTs in future    #3. Thoracic lymphadenopathy  As above    #4. Tobacco abuse  40 pack years  Lung Cancer Counseling and Shared Decision Making Session in an Asymptomatic Smoker/Former Smoker   Pato Estevez is a 68 year old male without current symptoms of lung cancer.  History   Smoking Status    Some Days    Packs/day: 0.00    Years: 40.00    Types: Cigarettes, Cigars    Start date: 1982    Last attempt to quit: 1/1/2019   Smokeless Tobacco    Never        He received information on the importance of adherence to annual lung cancer Low Dose CT (LDCT) screening, the impact of his comorbidities and his ability or willingness to undergo diagnosis and treatment. he qualifies for low dose CT lung cancer screening, however due to ongoing evaluation for lung nodule, low dose CT lung cancer screening should be postponed until he completes above  testing.  We counseled the importance of maintaining cigarette smoking abstinence if he is a former smoker and the importance of smoking cessation if he is a current smoker and we discussed and furnished information about tobacco cessation interventions.       Nehemiah Yadav MD  2/12/2024

## 2024-02-12 NOTE — PATIENT INSTRUCTIONS
Obtain a CT chest scan to check the nodule. Call central scheduling at [phone] to schedule the CT scan   I will plan to call you once the scan is done  Call/message with questions/concerns

## 2024-02-25 ENCOUNTER — HOSPITAL ENCOUNTER (OUTPATIENT)
Dept: CT IMAGING | Age: 69
Discharge: HOME | End: 2024-02-25
Attending: INTERNAL MEDICINE
Payer: MEDICARE

## 2024-02-25 ENCOUNTER — HOSPITAL ENCOUNTER (OUTPATIENT)
Dept: CT IMAGING | Age: 69
End: 2024-02-25
Attending: INTERNAL MEDICINE
Payer: MEDICARE

## 2024-02-25 DIAGNOSIS — R91.1 PULMONARY NODULE: ICD-10-CM

## 2024-02-25 DIAGNOSIS — R59.0 THORACIC LYMPHADENOPATHY: ICD-10-CM

## 2024-02-25 PROCEDURE — 71250 CT THORAX DX C-: CPT | Performed by: INTERNAL MEDICINE

## 2024-03-08 ENCOUNTER — PATIENT MESSAGE (OUTPATIENT)
Facility: CLINIC | Age: 69
End: 2024-03-08

## 2024-03-08 DIAGNOSIS — R91.1 LUNG NODULE: Primary | ICD-10-CM

## 2024-03-08 NOTE — TELEPHONE ENCOUNTER
From: Pato Estevez  To: Nehemiah Yadav  Sent: 3/8/2024 10:34 AM CST  Subject: Results of CT scan on March 25, 2024    Hi, I am just wondering if the  has seen the results and what happens next.  Thank You  Sreedhar

## 2024-03-08 NOTE — TELEPHONE ENCOUNTER
Called and reviewed results of CT chest with pateint - improved RUL nodule down to 6mm.  Will plan on repeat CT chest in six months    Nehemiah Yadav MD

## 2024-03-18 ENCOUNTER — PATIENT MESSAGE (OUTPATIENT)
Facility: CLINIC | Age: 69
End: 2024-03-18

## 2024-03-18 NOTE — TELEPHONE ENCOUNTER
From: Pato Estevez  To: Nehemiah Yadav  Sent: 3/18/2024 8:20 AM CDT  Subject: September scan scheduled    Hi, I have scheduled my next CT scan for September 8, 2024. Just to let you know.

## 2024-04-10 DIAGNOSIS — I10 ESSENTIAL HYPERTENSION, BENIGN: Chronic | ICD-10-CM

## 2024-04-12 RX ORDER — AMLODIPINE BESYLATE 5 MG/1
5 TABLET ORAL DAILY
Qty: 90 TABLET | Refills: 0 | Status: SHIPPED | OUTPATIENT
Start: 2024-04-12

## 2024-04-12 NOTE — TELEPHONE ENCOUNTER
amLODIPine 5 MG Oral Tab         Sig: Take 1 tablet (5 mg total) by mouth daily.    Disp: 90 tablet    Refills: 0    Start: 4/10/2024    Class: Normal    Non-formulary For: Essential hypertension, benign    Last ordered: 2 months ago (1/26/2024) by Elma Elena MD    Hypertension Medications Protocol Ozqjtn63/10/2024 06:01 PM   Protocol Details Last BP reading less than 140/90    CMP or BMP in past 12 months    In person appointment or virtual visit in the past 12 mos or appointment in next 3 mos    EGFRCR or GFRNAA > 50      To be filled at: [facility] - [facility] [address] [phone], [phone]

## 2024-05-16 ENCOUNTER — PATIENT OUTREACH (OUTPATIENT)
Dept: INTERNAL MEDICINE CLINIC | Facility: CLINIC | Age: 69
End: 2024-05-16

## 2024-05-16 NOTE — PROGRESS NOTES
Outgoing call to patient in attempt to schedule annual wellness visit. Patient states that he will call at a later time to schedule if not contacted sooner as he is not due until December.

## 2024-06-24 DIAGNOSIS — I10 ESSENTIAL HYPERTENSION, BENIGN: Chronic | ICD-10-CM

## 2024-06-25 RX ORDER — AMLODIPINE BESYLATE 5 MG/1
5 TABLET ORAL DAILY
Qty: 90 TABLET | Refills: 1 | Status: SHIPPED | OUTPATIENT
Start: 2024-06-25

## 2024-06-25 NOTE — TELEPHONE ENCOUNTER
Protocol failed     Amlodipine 5mg     LOV: 12/1/23   RTC: 6 months   Filled: 4/12/24 #90 0 refills   Labs: 11/21/23   Future Appointments   Date Time Provider Department Center   9/8/2024  8:15 AM TALIB [facility]N [facility]

## 2024-07-03 DIAGNOSIS — E78.00 HYPERCHOLESTEREMIA: Chronic | ICD-10-CM

## 2024-07-05 RX ORDER — PRAVASTATIN SODIUM 10 MG
TABLET ORAL
Qty: 90 TABLET | Refills: 1 | Status: SHIPPED | OUTPATIENT
Start: 2024-07-05

## 2024-07-05 NOTE — TELEPHONE ENCOUNTER
Protocol passed     Pravastatin 10mg     LOV: 12/1/23   RTC: 6 month  Filled: 2/8/24 #90 1 refill   Labs: 11/21/23   Future Appointments   Date Time Provider Department Center   9/8/2024  8:15 AM [facility] 1 [facility]

## 2024-09-08 ENCOUNTER — HOSPITAL ENCOUNTER (OUTPATIENT)
Dept: CT IMAGING | Age: 69
Discharge: HOME | End: 2024-09-08
Attending: INTERNAL MEDICINE
Payer: MEDICARE

## 2024-09-08 ENCOUNTER — HOSPITAL ENCOUNTER (OUTPATIENT)
Dept: CT IMAGING | Age: 69
End: 2024-09-08
Attending: INTERNAL MEDICINE
Payer: MEDICARE

## 2024-09-08 DIAGNOSIS — R91.1 LUNG NODULE: ICD-10-CM

## 2024-09-08 PROCEDURE — 71250 CT THORAX DX C-: CPT | Performed by: INTERNAL MEDICINE

## 2024-10-06 LAB — AMB EXT OCCULT BLOOD RESULT: NEGATIVE

## 2024-10-24 ENCOUNTER — TELEPHONE (OUTPATIENT)
Dept: INTERNAL MEDICINE CLINIC | Facility: CLINIC | Age: 69
End: 2024-10-24

## 2024-11-02 ENCOUNTER — LAB ENCOUNTER (OUTPATIENT)
Dept: LAB | Age: 69
End: 2024-11-02
Attending: INTERNAL MEDICINE
Payer: MEDICARE

## 2024-11-02 DIAGNOSIS — Z00.00 ROUTINE GENERAL MEDICAL EXAMINATION AT A HEALTH CARE FACILITY: ICD-10-CM

## 2024-11-02 LAB
ALBUMIN SERPL-MCNC: 4.4 G/DL (ref 3.2–4.8)
ALBUMIN/GLOB SERPL: 1.3 {RATIO} (ref 1–2)
ALP LIVER SERPL-CCNC: 60 U/L
ALT SERPL-CCNC: 15 U/L
ANION GAP SERPL CALC-SCNC: 6 MMOL/L (ref 0–18)
AST SERPL-CCNC: 26 U/L (ref ?–34)
BASOPHILS # BLD AUTO: 0.09 X10(3) UL (ref 0–0.2)
BASOPHILS NFR BLD AUTO: 1.3 %
BILIRUB SERPL-MCNC: 0.8 MG/DL (ref 0.2–1.1)
BILIRUB UR QL STRIP.AUTO: NEGATIVE
BUN BLD-MCNC: 12 MG/DL (ref 9–23)
CALCIUM BLD-MCNC: 9.6 MG/DL (ref 8.7–10.4)
CHLORIDE SERPL-SCNC: 99 MMOL/L (ref 98–112)
CHOLEST SERPL-MCNC: 167 MG/DL (ref ?–200)
CLARITY UR REFRACT.AUTO: CLEAR
CO2 SERPL-SCNC: 26 MMOL/L (ref 21–32)
COMPLEXED PSA SERPL-MCNC: 0.74 NG/ML (ref ?–4)
CREAT BLD-MCNC: 0.89 MG/DL
EGFRCR SERPLBLD CKD-EPI 2021: 93 ML/MIN/1.73M2 (ref 60–?)
EOSINOPHIL # BLD AUTO: 0.33 X10(3) UL (ref 0–0.7)
EOSINOPHIL NFR BLD AUTO: 4.7 %
ERYTHROCYTE [DISTWIDTH] IN BLOOD BY AUTOMATED COUNT: 13.8 %
EST. AVERAGE GLUCOSE BLD GHB EST-MCNC: 97 MG/DL (ref 68–126)
FASTING PATIENT LIPID ANSWER: YES
FASTING STATUS PATIENT QL REPORTED: YES
GLOBULIN PLAS-MCNC: 3.5 G/DL (ref 2–3.5)
GLUCOSE BLD-MCNC: 79 MG/DL (ref 70–99)
GLUCOSE UR STRIP.AUTO-MCNC: NORMAL MG/DL
HBA1C MFR BLD: 5 % (ref ?–5.7)
HCT VFR BLD AUTO: 40.6 %
HDLC SERPL-MCNC: 89 MG/DL (ref 40–59)
HGB BLD-MCNC: 14 G/DL
IMM GRANULOCYTES # BLD AUTO: 0.04 X10(3) UL (ref 0–1)
IMM GRANULOCYTES NFR BLD: 0.6 %
KETONES UR STRIP.AUTO-MCNC: NEGATIVE MG/DL
LDLC SERPL CALC-MCNC: 67 MG/DL (ref ?–100)
LEUKOCYTE ESTERASE UR QL STRIP.AUTO: NEGATIVE
LYMPHOCYTES # BLD AUTO: 2.55 X10(3) UL (ref 1–4)
LYMPHOCYTES NFR BLD AUTO: 36.5 %
MCH RBC QN AUTO: 33.2 PG (ref 26–34)
MCHC RBC AUTO-ENTMCNC: 34.5 G/DL (ref 31–37)
MCV RBC AUTO: 96.2 FL
MONOCYTES # BLD AUTO: 0.77 X10(3) UL (ref 0.1–1)
MONOCYTES NFR BLD AUTO: 11 %
NEUTROPHILS # BLD AUTO: 3.2 X10 (3) UL (ref 1.5–7.7)
NEUTROPHILS # BLD AUTO: 3.2 X10(3) UL (ref 1.5–7.7)
NEUTROPHILS NFR BLD AUTO: 45.9 %
NITRITE UR QL STRIP.AUTO: NEGATIVE
NONHDLC SERPL-MCNC: 78 MG/DL (ref ?–130)
OSMOLALITY SERPL CALC.SUM OF ELEC: 271 MOSM/KG (ref 275–295)
PH UR STRIP.AUTO: 6 [PH] (ref 5–8)
PLATELET # BLD AUTO: 198 10(3)UL (ref 150–450)
POTASSIUM SERPL-SCNC: 3.7 MMOL/L (ref 3.5–5.1)
PROT SERPL-MCNC: 7.9 G/DL (ref 5.7–8.2)
PROT UR STRIP.AUTO-MCNC: NEGATIVE MG/DL
RBC # BLD AUTO: 4.22 X10(6)UL
RBC UR QL AUTO: NEGATIVE
SODIUM SERPL-SCNC: 131 MMOL/L (ref 136–145)
SP GR UR STRIP.AUTO: 1.01 (ref 1–1.03)
T4 FREE SERPL-MCNC: 1.3 NG/DL (ref 0.8–1.7)
TRIGL SERPL-MCNC: 55 MG/DL (ref 30–149)
TSI SER-ACNC: 2.4 UIU/ML (ref 0.55–4.78)
UROBILINOGEN UR STRIP.AUTO-MCNC: NORMAL MG/DL
VLDLC SERPL CALC-MCNC: 8 MG/DL (ref 0–30)
WBC # BLD AUTO: 7 X10(3) UL (ref 4–11)

## 2024-11-02 PROCEDURE — 36415 COLL VENOUS BLD VENIPUNCTURE: CPT

## 2024-11-02 PROCEDURE — 85025 COMPLETE CBC W/AUTO DIFF WBC: CPT

## 2024-11-02 PROCEDURE — 84443 ASSAY THYROID STIM HORMONE: CPT

## 2024-11-02 PROCEDURE — 84439 ASSAY OF FREE THYROXINE: CPT

## 2024-11-02 PROCEDURE — 81003 URINALYSIS AUTO W/O SCOPE: CPT

## 2024-11-02 PROCEDURE — 83036 HEMOGLOBIN GLYCOSYLATED A1C: CPT

## 2024-11-02 PROCEDURE — 80061 LIPID PANEL: CPT

## 2024-11-02 PROCEDURE — 80053 COMPREHEN METABOLIC PANEL: CPT

## 2024-11-07 ENCOUNTER — OFFICE VISIT (OUTPATIENT)
Dept: INTERNAL MEDICINE CLINIC | Facility: CLINIC | Age: 69
End: 2024-11-07
Payer: MEDICARE

## 2024-11-07 VITALS
HEART RATE: 64 BPM | OXYGEN SATURATION: 100 % | TEMPERATURE: 98 F | BODY MASS INDEX: 29.06 KG/M2 | RESPIRATION RATE: 16 BRPM | WEIGHT: 203 LBS | DIASTOLIC BLOOD PRESSURE: 60 MMHG | HEIGHT: 70 IN | SYSTOLIC BLOOD PRESSURE: 122 MMHG

## 2024-11-07 DIAGNOSIS — I10 ESSENTIAL HYPERTENSION, BENIGN: Chronic | ICD-10-CM

## 2024-11-07 DIAGNOSIS — I83.893 VARICOSE VEINS OF BOTH LEGS WITH EDEMA: Chronic | ICD-10-CM

## 2024-11-07 DIAGNOSIS — E78.00 HYPERCHOLESTEREMIA: Chronic | ICD-10-CM

## 2024-11-07 DIAGNOSIS — Z00.00 ROUTINE GENERAL MEDICAL EXAMINATION AT A HEALTH CARE FACILITY: Primary | ICD-10-CM

## 2024-11-07 PROBLEM — R93.89 ABNORMAL CT OF THE CHEST: Chronic | Status: ACTIVE | Noted: 2023-12-11

## 2024-11-07 PROBLEM — K80.20 GALL STONES: Chronic | Status: ACTIVE | Noted: 2023-12-11

## 2024-11-07 NOTE — PROGRESS NOTES
REASON FOR VISIT:    Pato Estevez is a 69 year old male who presents for a MA Supervisit.    Male      Patient Care Team: Patient Care Team:  Hima Benjamin MD as PCP - General (Internal Medicine)  Adithya Valladares MD (UROLOGY)  Nehemiah Yadav MD (PULMONARY DISEASES)  Roger Thompson DO (GASTROENTEROLOGY)  Dm Daniel MD (OPHTHALMOLOGY)    Patient Active Problem List   Diagnosis    Essential hypertension, benign    Hypercholesteremia    Varicose veins of both legs with edema    Coronary artery calcification    Gall stones    Abnormal CT of the chest     Current Outpatient Medications   Medication Sig Dispense Refill    pravastatin 10 MG Oral Tab TAKE 1 TABLET EVERY DAY 90 tablet 1    AMLODIPINE 5 MG Oral Tab TAKE 1 TABLET EVERY DAY 90 tablet 1    LISINOPRIL-HYDROCHLOROTHIAZIDE 20-12.5 MG Oral Tab TAKE 2 TABLETS EVERY DAY (Patient taking differently: Take 1 tablet by mouth 2 (two) times daily.) 180 tablet 10    Mometasone Furoate 0.1 % External Cream Apply 1 Application. topically 2 (two) times daily as needed. 60 g 3    aspirin (ASPIRIN LOW DOSE) 81 MG Oral Tab EC Take 1 tablet (81 mg total) by mouth once daily. 90 tablet 1    naproxen 220 MG Oral Tab Take 1-2 tablets (220-440 mg total) by mouth 2 (two) times daily as needed.             Latest Ref Rng & Units 11/2/2024     7:01 AM 11/21/2023     7:08 AM 4/7/2023     8:01 AM 9/29/2022     7:02 AM 2/23/2022     7:21 AM 8/13/2021     7:16 AM 2/25/2021     9:29 AM   Glucose and HbA1c   Glucose 70 - 99 mg/dL 79  107  86  111  90  83  110          Latest Ref Rng & Units 11/2/2024     7:01 AM 11/21/2023     7:08 AM 4/7/2023     8:01 AM 9/29/2022     7:02 AM 2/23/2022     7:21 AM 8/13/2021     7:16 AM 2/25/2021     9:29 AM   Cholesterol   Total Cholesterol <200 mg/dL 167  147  165  151  152  148  156    Triglycerides 30 - 149 mg/dL 55  53  58  63  48  58  83    HDL 40 - 59 mg/dL 89  76  87  81  76  63  64    LDL <100 mg/dL 67  60  66  57  65  73  75           Latest Ref Rng & Units 11/2/2024     7:01 AM 11/21/2023     7:08 AM 4/7/2023     8:01 AM 9/29/2022     7:02 AM 2/23/2022     7:21 AM 8/13/2021     7:16 AM 2/25/2021     9:29 AM   BUN and Cr   BUN 9 - 23 mg/dL 12  16  17  12  18  16  20    Creatinine 0.70 - 1.30 mg/dL 0.89  0.90  0.78  0.85  0.91  0.94  0.95          Latest Ref Rng & Units 11/2/2024     7:01 AM 11/21/2023     7:08 AM 4/7/2023     8:01 AM 9/29/2022     7:02 AM 2/23/2022     7:21 AM 8/13/2021     7:16 AM 2/25/2021     9:29 AM   AST and ALT   AST (SGOT) <34 U/L 26  16  27  20  22  21  15    ALT (SGPT) 10 - 49 U/L 15  20  36  25  24  24  19          Latest Ref Rng & Units 11/2/2024     7:01 AM 11/21/2023     7:08 AM 9/29/2022     7:02 AM 8/13/2021     7:16 AM 2/25/2021     9:29 AM 10/27/2020     7:58 AM   TSH and Free T4   TSH 0.550 - 4.780 uIU/mL 2.398  1.830  1.820  1.460  1.620  1.720    Free T4 0.8 - 1.7 ng/dL 1.3               Latest Ref Rng & Units 8/12/2014    10:00 AM   [facility] REVIEW FLOWSHEET PSA   PSA 0.010 - 4.000 ng/mL 0.603        General Health      In the past six months, have you lost more than 10 pounds without trying?: (Patient-Rptd) 2 - No  Has your appetite been poor?: (Patient-Rptd) No  Type of Diet: (Patient-Rptd) Balanced  How does the patient maintain a good energy level?: (Patient-Rptd) Other  How would you describe your daily physical activity?: (Patient-Rptd) Light  How would you describe your current health state?: (Patient-Rptd) Fair  How do you maintain positive mental well-being?: (Patient-Rptd) Social Interaction;Puzzles;Visiting Family  Have you had any immunizations at another office such as Influenza, Hepatitis B, Tetanus, or Pneumococcal?: (Patient-Rptd) No     Functional Ability     Bathing or Showering: (Patient-Rptd) Able without help  Toileting: (Patient-Rptd) Able without help  Dressing: (Patient-Rptd) Able without help  Eating: (Patient-Rptd) Able without help  Driving: (Patient-Rptd) Able without  help  Preparing your meals: (Patient-Rptd) Able without help  Managing money/bills: (Patient-Rptd) Able without help  Taking medications as prescribed: (Patient-Rptd) Able without help  Are you able to afford your medications?: (Patient-Rptd) Yes  Hearing Problems?: (Patient-Rptd) No     Functional Status     Hearing Problems?: (Patient-Rptd) No  Vision Problems? : (Patient-Rptd) No  Difficulty walking?: (Patient-Rptd) No  Difficulty dressing or bathing?: (Patient-Rptd) No  Problems with daily activities? : (Patient-Rptd) No  Memory Problems?: (Patient-Rptd) No      Fall/Risk Assessment                 Depression Screening (PHQ-2/PHQ-9): Over the LAST 2 WEEKS            Advance Directives     Do you have a healthcare power of ?: (Patient-Rptd) Yes  Do you have a living will?: (Patient-Rptd) No     Cognitive Assessment     What day of the week is this?: Correct  What month is it?: Correct  What year is it?: Correct  Recall \"Ball\": Correct  Recall \"Flag\": Correct  Recall \"Tree\": Correct         PREVENTATIVE SERVICES   INDICATIONS AND SCHEDULE Internal Lab or Procedure   Diabetes Screening     HbgA1C   Annually HgbA1C (%)   Date Value   11/02/2024 5.0       Fasting Blood Sugar (FSB)Annually Glucose (mg/dL)   Date Value   11/02/2024 79      Cardiovascular Disease Screening    LDL Annually LDL Cholesterol (mg/dL)   Date Value   11/02/2024 67       EKG - w/ Initial Preventative Physical Exam only, or if medically necessary cardio   Colorectal Cancer Screening     Colonoscopy Screen every 10 years Health Maintenance   Topic Date Due    Colorectal Cancer Screening  10/07/2024       Flex Sigmoidoscopy Screen every 5 years No results found for this or any previous visit.    Fecal Occult Blood Annually Occult Blood Result (no units)   Date Value   10/06/2024 Negative      Glaucoma Screening     Ophthalmology Visit Annually Dr RICHA garcia   Immunizations     Zoster (Not covered by Medicare Part B) No orders found for  this or any previous visit.     SPECIFIC DISEASE MONITORING Internal Lab or Procedure     Diabetes     HgbA1C  Annually HgbA1C (%)   Date Value   11/02/2024 5.0       Creat/alb ratio  Annually Creatinine (mg/dL)   Date Value   11/02/2024 0.89       LDL  Annually LDL Cholesterol (mg/dL)   Date Value   11/02/2024 67       Dilated Eye exam  Annually     4/26/2021     1:42 PM   Data entered on:   Last Dilated Eye Exam 3/29/2021              ALLERGIES:   Allergies[1]  MEDICAL INFORMATION:   Past Medical History:    Back problem    DJD (degenerative joint disease) of lumbar spine    High blood pressure    Other and unspecified hyperlipidemia    Rotator cuff disorder    LT    Type II or unspecified type diabetes mellitus without mention of complication, not stated as uncontrolled    Unspecified essential hypertension    Varicose vein    Visual impairment    reading glasses      Past Surgical History:   Procedure Laterality Date    Colonoscopy N/A 10/14/2015    Procedure: COLONOSCOPY;  Surgeon: Abundio Randall MD;  Location:  ENDOSCOPY    Other Right 8/25/2015    endo luminal laser vein ablation    Other Left 9/18/2015    endo luminal laser vein ablation    Other surgical history      para    Other surgical history  2000    vericose vein removal     Tonsillectomy  as a child    Vasectomy  1984      Family History   Problem Relation Age of Onset    Hypertension Father      Immunization History      Immunization History  Administered            Date(s) Administered    Covid-19 Vaccine Pfizer 30 mcg/0.3 ml                          02/08/2021 03/01/2021      FLU VAC High Dose 65 YRS & Older PRSV Free (09433)                          11/06/2020  10/29/2021  10/06/2022      FLUZONE 6 months and older PFS 0.5 ml (65076)                          10/10/2014  09/18/2018      Flublok Quad Influenza Vaccine (19465)                          10/12/2023      Influenza             09/23/2015 09/29/2016 09/07/2017                             2018  10/03/2019      Influenza(Afluria)0.5ml QIV PFS                          10/03/2019      Pfizer Covid-19 Vaccine 30mcg/0.3ml 12yrs+                          10/12/2023      Pneumococcal Conjugate PCV20                          2023      Pneumovax 23          2018      TDAP                  2018      Zoster Vaccine Live (Zostavax)                          2015      Zoster Vaccine Recombinant Adjuvanted (Shingrix)                          11/10/2020  2021        SOCIAL HISTORY:   Social History     Socioeconomic History    Marital status:    Tobacco Use    Smoking status: Some Days     Current packs/day: 0.00     Types: Cigarettes, Cigars     Start date:      Last attempt to quit: 2019     Years since quittin.8    Smokeless tobacco: Never    Tobacco comments:     Relapsed in 9711-4963 smokes about every other day   Vaping Use    Vaping status: Never Used   Substance and Sexual Activity    Alcohol use: Yes     Comment: 2023: \"A few beers a day\"    Drug use: Yes     Types: Cannabis     Comment: Rarely   Other Topics Concern    Caffeine Concern Yes     Comment: At least 4 cups of coffee daily    Exercise No    Seat Belt No    Special Diet No    Stress Concern No    Weight Concern No        REVIEW OF SYSTEMS:   General/Constitutional:   General able to do usual activities, good exercise tolerance, good general state of health, no fatigue, no fever, no weakness .   HEENT/Neck:   Head no dizziness, no lightheadedness. Eyes normal vision, no redness , no drainage will schedule an eye appointment. Ears no earaches, no fullness, normal hearing, no tinnitus. Nose and Sinuses no recurrent colds, no stuffiness, no discharge, no hay fever, no nosebleeds, no sinus trouble. Mouth and Pharynx no sore throats, no hoarseness. Neck no lumps, no goiter, no neck stiffness or pain.   Endocrine:   Diabetes resolved --patient very motivated.. Thyroid disorder none.    Respiratory:   Patient denies chest pain, cough, RUELAS (dyspnea on exertion),wheezing. Breathing normal pattern . Chest congestion none.   Cardiovascular:   Patient denies chest pain, rheumatic fever,heart murmur.. Leg edema none. Orthopnea no. Palpitations none. PND (paroxsymal nocturnal dyspnea) none.  Exercise none    Gastrointestinal:   Patient denies abdominal pain, acid reflux, blood in stool, vomiting, nausea, heartburn  no change in appetite noted no change in bowel movement noted. Dysphagia none.    Voluntary weight loss by eating sensibly and cutting portions  Hematology:   Patient denies abnormal bleeding, easy bruising. Enlarged lymph nodes none.   Men Only:   Patient denies difficulty with erection, penile discharge, testicular pain or swelling, urgency/frequency.   Genitourinary:   Patient denies blood in urine, burning on urination, difficulty urinating, dysuria, urinary frequency , urinary incontinence/no history of kidney disease or genital abnormalities. Dysuria none. Nocturia None.   Musculoskeletal:   Patient denies arthritis ,+ for chronic low back pain, muscle weakness none. Joint pain chronic Left  and right Shoulder pain. Joint stiffness none.   Peripheral Vascular:   General + for large varicosities, no claudication.   Dermatologic:   Rash none.   Neurologic:   Patient denies dizziness, fainting, headache, loss of consciousness, memory loss, seizures, paresthesias, weakness. Tingling/numbness none. Trouble with balance none.   Psychiatric:   Patient denies anxiety, depression, hallucinations. Insomnia none/no hx of sleep disorder. Memory loss none.     EXAM:   /60 (BP Location: Right arm, Patient Position: Sitting, Cuff Size: adult)   Pulse 64   Temp 97.9 °F (36.6 °C) (Temporal)   Resp 16   Ht 5' 10\" (1.778 m)   Wt 203 lb (92.1 kg)   SpO2 100%   BMI 29.13 kg/m²    >   BP Readings from Last 3 Encounters:   11/07/24 122/60   02/12/24 150/62   12/01/23 124/56     GENERAL:   Build:  average .   HEENT:   Ear canals: normal.  Hearing is normal  EOM: within normal limits.Pupils BEERTL.Sclera and Conjunctiva normal.     Head: normocephalic.   Nasal septum: midline.   Nose: normal pink mucosa, no congestion, no swelling, no bleeding.   Oral cavity: normal, no lesions seen.   Turbinates: normal.   NECK:   Carotid bruit: none.   Cervical lymph nodes: unremarkable.   JVD: none.   Range of Motion: normal.   Thyroid: unremarkable.   HEART:   Clicks: no.   Edema: none visible .   Heart sounds: normal.   Murmurs: none.   Rhythm: regular.   LUNGS:   Auscultation: clear .   Chest Shape: normal .   Percussion: normal.   Rales: no .   Respiratory effort: normal .   Rhonchi: no.   Wheezes: no.   ABDOMEN:   Bowel sounds: normal.   General: normal.   Hernia: absent.   Liver, Spleen: no hepatosplenomegaly (HSM).   Tenderness: absent .   GENITOURINARY - MALE:   External genitals: unremarkable.   CLAUDIO: normal .   Penis: unremarkable.   Prostate: unremarkable.   Testicles: unremarkable.   EXTREMITIES:   Clubbing: none.   Cyanosis: absent .   Edema: none.   Pulses: present.   Tremors: no.   Varicose veins: present, bilaterally extensive.   MUSCULOSKELETAL:   Cervical spines: normal.   L-S spines: decreased range of motion secondary to pain.   Lower extremity joints: OA knees.   Upper extremity joints: left and right shoulder, stiffness -abd and rotation are tender.   NEUROLOGICAL:   Babinski: negative..   Cerebellar Testing grossly/intact: yes..   Cranial Nerves: CN's II-XII grossly intact.   Gait: normal.   Motor: Power,tone,co-ordination normalInvoluntary movements and wasting none.   Reflexes: normal.   Sensory: all sensory modalities normal.   LYMPHATICS:   Cervical: none.   Groin: no adenopathy .   Inguinal: no adenopathy.   Supraclavicular: none.   DERMATOLOGY:   Rash: no. But has stasis dermatitis      Bilateral INSPECTION stasis dermatitis secondary to extensive varicosities  CIRCULATION normal except for  extensive varicosities  MONOFILAMENT normal      Pulsation pedal pulse exam of both lower legs/feet is normal as well.  ASSESSMENT AND OTHER RELEVANT CHRONIC CONDITIONS:   Pato Estevez is a 69 year old male who presents for a Medicare Assessment.     PLAN SUMMARY:   Diagnoses and all orders for this visit:    Routine general medical examination at a health care facility  -     Gastro Referral - In Network    Essential hypertension, benign  -     CARDIO - INTERNAL    Hypercholesteremia  -     CARDIO - INTERNAL    Varicose veins of both legs with edema    Other orders  -     High Dose Fluzone trivalent influenza, 65 yrs+ PFS [42634]    Discussed with patient.  All above are stable.  The patient indicates understanding of these issues and agrees to the plan.  The patient is asked to return in 6 months for medication check  Diet counseling perfomed    SUGGESTED VACCINATIONS - Influenza, Pneumococcal, Zoster, Tetanus   Influenza: Influenza Vaccine(1) due on 10/01/2024  Pneumonia: No recommendations at this time               [1] No Known Allergies

## 2024-11-18 DIAGNOSIS — I10 ESSENTIAL HYPERTENSION, BENIGN: Chronic | ICD-10-CM

## 2024-11-19 RX ORDER — LISINOPRIL AND HYDROCHLOROTHIAZIDE 12.5; 2 MG/1; MG/1
1 TABLET ORAL 2 TIMES DAILY
Qty: 180 TABLET | Refills: 3 | Status: SHIPPED | OUTPATIENT
Start: 2024-11-19

## 2024-11-19 RX ORDER — AMLODIPINE BESYLATE 5 MG/1
5 TABLET ORAL DAILY
Qty: 90 TABLET | Refills: 3 | Status: SHIPPED | OUTPATIENT
Start: 2024-11-19

## 2024-11-19 NOTE — TELEPHONE ENCOUNTER
Protocol passed     LOV: 11/7/24   RTC: 6 months   Lisinopril-hydrochlorothiazide 20-12.5mg filled 11/14/23 #90 3 refill   Amlodipine 5mg fille 6/25/24 #90 1 refill   Labs: 11/2/24   No future appointments.

## 2024-11-28 DIAGNOSIS — E78.00 HYPERCHOLESTEREMIA: Chronic | ICD-10-CM

## 2024-11-29 RX ORDER — PRAVASTATIN SODIUM 10 MG
TABLET ORAL
Qty: 90 TABLET | Refills: 3 | Status: SHIPPED | OUTPATIENT
Start: 2024-11-29

## 2024-11-29 NOTE — TELEPHONE ENCOUNTER
Protocol passed     LOV: 11/7/24  RTC: 6 months  Filled: 7/5/24 #90 1 refill   Labs: 11/2/24  No future appointments.

## 2025-03-03 ENCOUNTER — OFFICE VISIT (OUTPATIENT)
Dept: INTERNAL MEDICINE CLINIC | Facility: CLINIC | Age: 70
End: 2025-03-03
Payer: MEDICARE

## 2025-03-03 VITALS
TEMPERATURE: 98 F | DIASTOLIC BLOOD PRESSURE: 78 MMHG | SYSTOLIC BLOOD PRESSURE: 128 MMHG | RESPIRATION RATE: 16 BRPM | WEIGHT: 196 LBS | BODY MASS INDEX: 28.06 KG/M2 | HEART RATE: 78 BPM | HEIGHT: 70 IN | OXYGEN SATURATION: 100 %

## 2025-03-03 DIAGNOSIS — K40.90 UNILATERAL INGUINAL HERNIA WITHOUT OBSTRUCTION OR GANGRENE, RECURRENCE NOT SPECIFIED: ICD-10-CM

## 2025-03-03 DIAGNOSIS — B07.0 PLANTAR WART: ICD-10-CM

## 2025-03-03 DIAGNOSIS — I10 ESSENTIAL HYPERTENSION, BENIGN: Primary | Chronic | ICD-10-CM

## 2025-03-03 PROCEDURE — 1160F RVW MEDS BY RX/DR IN RCRD: CPT | Performed by: INTERNAL MEDICINE

## 2025-03-03 PROCEDURE — 3078F DIAST BP <80 MM HG: CPT | Performed by: INTERNAL MEDICINE

## 2025-03-03 PROCEDURE — 3008F BODY MASS INDEX DOCD: CPT | Performed by: INTERNAL MEDICINE

## 2025-03-03 PROCEDURE — 3074F SYST BP LT 130 MM HG: CPT | Performed by: INTERNAL MEDICINE

## 2025-03-03 PROCEDURE — 1159F MED LIST DOCD IN RCRD: CPT | Performed by: INTERNAL MEDICINE

## 2025-03-03 PROCEDURE — 99213 OFFICE O/P EST LOW 20 MIN: CPT | Performed by: INTERNAL MEDICINE

## 2025-03-03 RX ORDER — PRAVASTATIN SODIUM 20 MG
20 TABLET ORAL NIGHTLY
COMMUNITY
Start: 2025-01-14

## 2025-03-03 NOTE — PROGRESS NOTES
Pato Estevez  10/31/1955 is a 69 year old male.    Chief Complaint   Patient presents with    Growth     Located on left side of foot       HPI:   Does have a growth on the lateral aspect of his left foot  Also thinks he has a right inguinal hernia  Current Outpatient Medications   Medication Sig Dispense Refill    PRAVACHOL 20 MG Oral Tab Take 1 tablet (20 mg total) by mouth nightly.      lisinopril-hydroCHLOROthiazide 20-12.5 MG Oral Tab Take 1 tablet by mouth 2 (two) times daily. 180 tablet 3    AMLODIPINE 5 MG Oral Tab TAKE 1 TABLET EVERY DAY 90 tablet 3    Mometasone Furoate 0.1 % External Cream Apply 1 Application. topically 2 (two) times daily as needed. 60 g 3    aspirin (ASPIRIN LOW DOSE) 81 MG Oral Tab EC Take 1 tablet (81 mg total) by mouth once daily. 90 tablet 1    naproxen 220 MG Oral Tab Take 1-2 tablets (220-440 mg total) by mouth 2 (two) times daily as needed.        Past Medical History:    Back problem    DJD (degenerative joint disease) of lumbar spine    High blood pressure    Other and unspecified hyperlipidemia    Rotator cuff disorder    LT    Type II or unspecified type diabetes mellitus without mention of complication, not stated as uncontrolled    Unspecified essential hypertension    Varicose vein    Visual impairment    reading glasses      Social History:  Social History     Socioeconomic History    Marital status:    Tobacco Use    Smoking status: Some Days     Current packs/day: 0.00     Types: Cigarettes, Cigars     Start date:      Last attempt to quit: 2019     Years since quittin.1    Smokeless tobacco: Never    Tobacco comments:     Relapsed in 4721-1712 smokes about every other day   Vaping Use    Vaping status: Never Used   Substance and Sexual Activity    Alcohol use: Yes     Comment: 2023: \"A few beers a day\"    Drug use: Yes     Types: Cannabis     Comment: Rarely   Other Topics Concern    Caffeine Concern Yes     Comment: At least 4 cups of  coffee daily    Exercise No    Seat Belt No    Special Diet No    Stress Concern No    Weight Concern No        REVIEW OF SYSTEMS:   na        EXAM:   /78 (BP Location: Left arm, Patient Position: Sitting, Cuff Size: adult)   Pulse 78   Temp 98 °F (36.7 °C) (Temporal)   Resp 16   Ht 5' 10\" (1.778 m)   Wt 196 lb (88.9 kg)   SpO2 100%   BMI 28.12 kg/m²     Lateral aspect left foot midshaft left fifth metatarsal the patient does have a decent sized plantar wart  In addition to this he does have a reducible right inguinal hernia      ASSESSMENT AND PLAN:   Sreedhar was seen today for growth.    Diagnoses and all orders for this visit:    Essential hypertension, benign    Plantar wart  -     Podiatry Referral - In Network    Unilateral inguinal hernia without obstruction or gangrene, recurrence not specified  -     Surgery Referral - In Network    See consults as advised    Patient Instructions   CPX in November   The patient indicates understanding of these issues and agrees to the plan.  The patient is asked to Return in about 6 months (around 9/3/2025).  .      Hima Benjamin MD

## 2025-03-03 NOTE — PROGRESS NOTES
Pato Estevez  10/31/1955 is a 69 year old male.    Chief Complaint   Patient presents with    Growth     Located on left side of foot        HPI:     Current Outpatient Medications   Medication Sig Dispense Refill    PRAVACHOL 20 MG Oral Tab Take 1 tablet (20 mg total) by mouth nightly.      aspirin (ASPIRIN LOW DOSE) 81 MG Oral Tab EC Take 1 tablet (81 mg total) by mouth once daily. 90 tablet 1    naproxen 220 MG Oral Tab Take 1-2 tablets (220-440 mg total) by mouth 2 (two) times daily as needed.      lisinopril-hydroCHLOROthiazide 20-12.5 MG Oral Tab Take 1 tablet by mouth 2 (two) times daily. 180 tablet 3    AMLODIPINE 5 MG Oral Tab TAKE 1 TABLET EVERY DAY 90 tablet 3    Mometasone Furoate 0.1 % External Cream Apply 1 Application. topically 2 (two) times daily as needed. 60 g 3      Past Medical History:    Back problem    DJD (degenerative joint disease) of lumbar spine    High blood pressure    Other and unspecified hyperlipidemia    Rotator cuff disorder    LT    Type II or unspecified type diabetes mellitus without mention of complication, not stated as uncontrolled    Unspecified essential hypertension    Varicose vein    Visual impairment    reading glasses      Social History:  Social History     Socioeconomic History    Marital status:    Tobacco Use    Smoking status: Some Days     Current packs/day: 0.00     Types: Cigarettes, Cigars     Start date:      Last attempt to quit: 2019     Years since quittin.1    Smokeless tobacco: Never    Tobacco comments:     Relapsed in 9063-2171 smokes about every other day   Vaping Use    Vaping status: Never Used   Substance and Sexual Activity    Alcohol use: Yes     Comment: 2023: \"A few beers a day\"    Drug use: Yes     Types: Cannabis     Comment: Rarely   Other Topics Concern    Caffeine Concern Yes     Comment: At least 4 cups of coffee daily    Exercise No    Seat Belt No    Special Diet No    Stress Concern No    Weight Concern  No        REVIEW OF SYSTEMS:   Cardiovascular:   Syncope none. Rapid heart beat at rest no. Change in exercise tolerance no. Chest pain no. Chest pain while awake none. Cold extremities no. Dizziness no. Dyspnea on exertion none. Fainting none. Fatigue no. High blood pressure on medication(s). Irregular heart beat no. Leg edema no. Murmurs no. Orthopnea no.      EXAM:   Resp 16   Ht 5' 10\" (1.778 m)   Wt 196 lb (88.9 kg)   BMI 28.12 kg/m²   HEENT:   jvp not raised.   Ear canals: normal.   Ear drums: normal .   Ears: unremarkable.   Mouth: unremarkable.   Nasal septum: midline.   Pharynx: normal.   Sinuses: non-tender.   HEART:   Clicks: no.   Distal Pulses Palpable: yes.   Edema: none visible .   Gallop: no .   Heart sounds: normal S1S2.   Murmurs: none.   Rhythm: regular.   LUNGS:   Airflow: normal air movement.   Auscultation: no wheezing/rhonchi/rales.   Breath sounds bilaterally: symmetrical.       ASSESSMENT AND PLAN:   Sreedhar was seen today for growth.    Diagnoses and all orders for this visit:    Essential hypertension, benign      There are no Patient Instructions on file for this visit.     The patient indicates understanding of these issues and agrees to the plan.  The patient is asked to No follow-ups on file..    Hima Benjamin MD

## 2025-03-12 ENCOUNTER — OFFICE VISIT (OUTPATIENT)
Dept: PODIATRY CLINIC | Facility: CLINIC | Age: 70
End: 2025-03-12
Payer: MEDICARE

## 2025-03-12 DIAGNOSIS — Q82.8 POROKERATOSIS: ICD-10-CM

## 2025-03-12 DIAGNOSIS — L84 CALLUS OF FOOT: ICD-10-CM

## 2025-03-12 DIAGNOSIS — M77.42 METATARSALGIA OF LEFT FOOT: Primary | ICD-10-CM

## 2025-03-12 PROCEDURE — 99203 OFFICE O/P NEW LOW 30 MIN: CPT | Performed by: PODIATRIST

## 2025-03-12 PROCEDURE — 1159F MED LIST DOCD IN RCRD: CPT | Performed by: PODIATRIST

## 2025-03-12 NOTE — PROGRESS NOTES
Pato Estevez is a 69 year old male.   Chief Complaint   Patient presents with    Foot Pain     Presents with left foot pain, has \"lump\" left outer foot and pain to left foot pain. Started about 9 months ago         HPI:   1 presents to the clinic with a chief complaint of left foot pain he has a lump on the side of his foot with a thick callus and is very painful to walk and.  Had an insidious onset does not remember if he stepped on anything.  At today's visit reviewed nurse's history as taken above, allergies medications and medical history as documented below.  All changes duly noted  Allergies: Patient has no known allergies.   Current Outpatient Medications   Medication Sig Dispense Refill    PRAVACHOL 20 MG Oral Tab Take 1 tablet (20 mg total) by mouth nightly.      lisinopril-hydroCHLOROthiazide 20-12.5 MG Oral Tab Take 1 tablet by mouth 2 (two) times daily. 180 tablet 3    AMLODIPINE 5 MG Oral Tab TAKE 1 TABLET EVERY DAY 90 tablet 3    Mometasone Furoate 0.1 % External Cream Apply 1 Application. topically 2 (two) times daily as needed. 60 g 3    aspirin (ASPIRIN LOW DOSE) 81 MG Oral Tab EC Take 1 tablet (81 mg total) by mouth once daily. 90 tablet 1    naproxen 220 MG Oral Tab Take 1-2 tablets (220-440 mg total) by mouth 2 (two) times daily as needed.        Past Medical History:    Back problem    DJD (degenerative joint disease) of lumbar spine    High blood pressure    Other and unspecified hyperlipidemia    Rotator cuff disorder    LT    Type II or unspecified type diabetes mellitus without mention of complication, not stated as uncontrolled    Unspecified essential hypertension    Varicose vein    Visual impairment    reading glasses      Past Surgical History:   Procedure Laterality Date    Colonoscopy N/A 10/14/2015    Procedure: COLONOSCOPY;  Surgeon: Abundio Randall MD;  Location:  ENDOSCOPY    Other Right 8/25/2015    endo luminal laser vein ablation    Other Left 9/18/2015    endo luminal  laser vein ablation    Other surgical history      para    Other surgical history      vericose vein removal     Tonsillectomy  as a child    Vasectomy  1984      Family History   Problem Relation Age of Onset    Hypertension Father       Social History     Socioeconomic History    Marital status:    Tobacco Use    Smoking status: Some Days     Current packs/day: 0.00     Types: Cigarettes, Cigars     Start date:      Last attempt to quit: 2019     Years since quittin.1    Smokeless tobacco: Never    Tobacco comments:     Relapsed in [id number] smokes about every other day   Vaping Use    Vaping status: Never Used   Substance and Sexual Activity    Alcohol use: Yes     Comment: 2023: \"A few beers a day\"    Drug use: Yes     Types: Cannabis     Comment: Rarely   Other Topics Concern    Caffeine Concern Yes     Comment: At least 4 cups of coffee daily    Exercise No    Seat Belt No    Special Diet No    Stress Concern No    Weight Concern No           REVIEW OF SYSTEMS:   Today reviewed systens as documented below  GENERAL HEALTH: feels well otherwise  SKIN: Refer to exam below  RESPIRATORY: denies shortness of breath with exertion  CARDIOVASCULAR: denies chest pain on exertion  GI: denies abdominal pain and denies heartburn  NEURO: denies headaches    EXAM:   There were no vitals taken for this visit.  GENERAL: well developed, well nourished, in no apparent distress  EXTREMITIES:   1. Integument: The skin on both feet was examined is warm and dry the lateral plantar aspect of his left foot he has a very thick callus approximately 0.5 cm in thickness on the lateral aspect of his fifth metatarsal at the midshaft level.  Is painful to palpation.   2. Vascular: The patient has palpable dorsalis pedis posterior tibial pulses bilateral somewhat weak and but symmetrical and equivocal   3. Neurologic: Has intact sensorium   4. Musculoskeletal: Patient has good muscle strength he is  ambulatory.    ASSESSMENT AND PLAN:   Diagnoses and all orders for this visit:    Metatarsalgia of left foot    Callus of foot    Porokeratosis        Plan: Today after trimming it appears to be a very thick porokeratosis.  Using a sterile 15 blade and a disposable curette trimmed out debrided the hyperkeratosis.  Advised patient on home care keep it filed down on a semidaily basis apply moisturizing cream he can use a doughnut plant which was also applied at today's visit and then follow-up with us again in about 6 weeks or as needed.  Also told him he could use Mediplast on it to keep it down sample dispensed.    The patient indicates understanding of these issues and agrees to the plan.    Rasheed Henriquez DPM

## 2025-04-17 ENCOUNTER — OFFICE VISIT (OUTPATIENT)
Dept: PODIATRY CLINIC | Facility: CLINIC | Age: 70
End: 2025-04-17

## 2025-04-17 DIAGNOSIS — L84 CALLUS OF FOOT: ICD-10-CM

## 2025-04-17 DIAGNOSIS — M77.42 METATARSALGIA OF LEFT FOOT: Primary | ICD-10-CM

## 2025-04-17 DIAGNOSIS — Q82.8 POROKERATOSIS: ICD-10-CM

## 2025-04-17 PROCEDURE — 1159F MED LIST DOCD IN RCRD: CPT | Performed by: PODIATRIST

## 2025-04-17 PROCEDURE — 99213 OFFICE O/P EST LOW 20 MIN: CPT | Performed by: PODIATRIST

## 2025-04-17 PROCEDURE — 1125F AMNT PAIN NOTED PAIN PRSNT: CPT | Performed by: PODIATRIST

## 2025-04-17 NOTE — PROGRESS NOTES
Pato Estevez is a 69 year old male.   Chief Complaint   Patient presents with    Follow - Up     Left foot- after the last visit is helped but after 1 to 2 weeks the pain came back- rates pain 3/10          HPI:   Returns to clinic again complaining of a painful callus on the bottom of his left foot.  At today's visit reviewed nurse's history as taken above, allergies medications and medical history as documented below.  All changes duly noted  Allergies: Patient has no known allergies.   Current Medications[1]   Past Medical History[2]   Past Surgical History[3]   Family History[4]   Social Hx on file[5]        REVIEW OF SYSTEMS:   Today reviewed systens as documented below  GENERAL HEALTH: feels well otherwise  SKIN: Refer to exam below  RESPIRATORY: denies shortness of breath with exertion  CARDIOVASCULAR: denies chest pain on exertion  GI: denies abdominal pain and denies heartburn  NEURO: denies headaches    EXAM:   There were no vitals taken for this visit.  GENERAL: well developed, well nourished, in no apparent distress  EXTREMITIES:   1. Integument: The patient's left foot is warm and dry he has a large painful porokeratosis underneath the fifth metatarsal base on his left foot.   2. Vascular: Patient has palpable pulses both dorsalis pedis and posterior tibial on the left   3. Neurologic: Patient has intact sensorium   4. Musculoskeletal: Patient has good muscle strength all muscle groups affecting the foot and ankle fire 5 out of 5 against resistance on the left.    ASSESSMENT AND PLAN:   Diagnoses and all orders for this visit:    Metatarsalgia of left foot    Callus of foot    Porokeratosis        Plan: Today using a sterile 15 blade and disposable curette trimmed down and debrided the painful porokeratosis on the plantar left foot to the patient's relief padding applied.  Again reviewed routine care for him to keep it filed down moisturized and wear accommodative pads follow-up about every 2  months for care but sooner if required    The patient indicates understanding of these issues and agrees to the plan.    Rasheed Henriquez DPM       [1]   Current Outpatient Medications   Medication Sig Dispense Refill    PRAVACHOL 20 MG Oral Tab Take 1 tablet (20 mg total) by mouth nightly.      lisinopril-hydroCHLOROthiazide 20-12.5 MG Oral Tab Take 1 tablet by mouth 2 (two) times daily. 180 tablet 3    AMLODIPINE 5 MG Oral Tab TAKE 1 TABLET EVERY DAY 90 tablet 3    Mometasone Furoate 0.1 % External Cream Apply 1 Application. topically 2 (two) times daily as needed. 60 g 3    aspirin (ASPIRIN LOW DOSE) 81 MG Oral Tab EC Take 1 tablet (81 mg total) by mouth once daily. 90 tablet 1    naproxen 220 MG Oral Tab Take 1-2 tablets (220-440 mg total) by mouth 2 (two) times daily as needed.     [2]   Past Medical History:   Back problem    DJD (degenerative joint disease) of lumbar spine    High blood pressure    Other and unspecified hyperlipidemia    Rotator cuff disorder    LT    Type II or unspecified type diabetes mellitus without mention of complication, not stated as uncontrolled    Unspecified essential hypertension    Varicose vein    Visual impairment    reading glasses   [3]   Past Surgical History:  Procedure Laterality Date    Colonoscopy N/A 10/14/2015    Procedure: COLONOSCOPY;  Surgeon: Abundio Randall MD;  Location:  ENDOSCOPY    Other Right 8/25/2015    endo luminal laser vein ablation    Other Left 9/18/2015    endo luminal laser vein ablation    Other surgical history      para    Other surgical history  2000    vericose vein removal     Tonsillectomy  as a child    Vasectomy  1984   [4]   Family History  Problem Relation Age of Onset    Hypertension Father    [5]   Social History  Socioeconomic History    Marital status:    Tobacco Use    Smoking status: Some Days     Current packs/day: 0.00     Types: Cigarettes, Cigars     Start date: 1982     Last attempt to quit: 1/1/2019     Years  since quittin.2    Smokeless tobacco: Never    Tobacco comments:     Relapsed in [id number] smokes about every other day   Vaping Use    Vaping status: Never Used   Substance and Sexual Activity    Alcohol use: Yes     Comment: 2023: \"A few beers a day\"    Drug use: Yes     Types: Cannabis     Comment: Rarely   Other Topics Concern    Caffeine Concern Yes     Comment: At least 4 cups of coffee daily    Exercise No    Seat Belt No    Special Diet No    Stress Concern No    Weight Concern No

## 2025-04-22 ENCOUNTER — TELEPHONE (OUTPATIENT)
Dept: PODIATRY CLINIC | Facility: CLINIC | Age: 70
End: 2025-04-22

## 2025-04-22 NOTE — TELEPHONE ENCOUNTER
Medication(s) to Refill:   Pending Prescriptions Disp Refills    METFORMIN  MG Oral Tab [Pharmacy Med Name: METFORMIN 850MG TABLETS] 270 tablet 0     Sig: TAKE 1 TABLET BY MOUTH THREE TIMES DAILY           Last Time Medication was Filled:  05/11/201 admit for further treatment

## 2025-04-22 NOTE — TELEPHONE ENCOUNTER
Left voice message for patient to call and schedule a follow up for late June or early July with Dr Henriquez. Phone for scheduling is [phone].

## 2025-06-02 ENCOUNTER — TELEPHONE (OUTPATIENT)
Dept: INTERNAL MEDICINE CLINIC | Facility: CLINIC | Age: 70
End: 2025-06-02

## 2025-06-02 NOTE — TELEPHONE ENCOUNTER
Dm eye exam received     Dm flowsheet updated     Dm eye exam report placed in [facility] IN[facility]BASKET to review

## 2025-06-24 ENCOUNTER — OFFICE VISIT (OUTPATIENT)
Dept: PODIATRY CLINIC | Facility: CLINIC | Age: 70
End: 2025-06-24

## 2025-06-24 DIAGNOSIS — M77.42 METATARSALGIA OF LEFT FOOT: Primary | ICD-10-CM

## 2025-06-24 DIAGNOSIS — L84 CALLUS OF FOOT: ICD-10-CM

## 2025-06-24 DIAGNOSIS — Q82.8 POROKERATOSIS: ICD-10-CM

## 2025-06-24 PROCEDURE — 99213 OFFICE O/P EST LOW 20 MIN: CPT | Performed by: PODIATRIST

## 2025-06-24 PROCEDURE — 1159F MED LIST DOCD IN RCRD: CPT | Performed by: PODIATRIST

## 2025-06-24 PROCEDURE — 1125F AMNT PAIN NOTED PAIN PRSNT: CPT | Performed by: PODIATRIST

## 2025-06-24 NOTE — PROGRESS NOTES
Pato Estevez is a 69 year old male.   Chief Complaint   Patient presents with    Follow - Up     Left foot- patient states it gets better for about 1 week or 2 - rates pain 8/10 when walking, standing          HPI:   Left foot.  He tries to keep it filed down but when it builds up it gets so painful he cannot apply enough pressure to do it even after he has done showering or bathing.  At today's visit reviewed nurse's history as taken above, allergies medications and medical history as documented below.  All changes duly noted  Allergies: Patient has no known allergies.   Current Medications[1]   Past Medical History[2]   Past Surgical History[3]   Family History[4]   Social Hx on file[5]        REVIEW OF SYSTEMS:   Today reviewed systens as documented below  GENERAL HEALTH: feels well otherwise  SKIN: Refer to exam below  RESPIRATORY: denies shortness of breath with exertion  CARDIOVASCULAR: denies chest pain on exertion  GI: denies abdominal pain and denies heartburn  NEURO: denies headaches    EXAM:   There were no vitals taken for this visit.  GENERAL: well developed, well nourished, in no apparent distress  EXTREMITIES:   1. Integument: The patient's left foot is warm and dry he has a large painful porokeratosis underneath the fifth metatarsal base on his left foot.   2. Vascular: Patient has palpable pulses both dorsalis pedis and posterior tibial on the left   3. Neurologic: Patient has intact sensorium   4. Musculoskeletal: Patient has good muscle strength all muscle groups affecting the foot and ankle fire 5 out of 5 against resistance on the left.    ASSESSMENT AND PLAN:   Diagnoses and all orders for this visit:    Metatarsalgia of left foot    Callus of foot    Porokeratosis        Plan: Today using a sterile 15 blade and disposable curette trimmed down and debrided the painful porokeratosis on the plantar left foot to the patient's relief padding applied.  Again reviewed routine care for him to  keep it filed down moisturized and wear accommodative pads follow-up about every 2 months for care but sooner if required    The patient indicates understanding of these issues and agrees to the plan.    Rasheed Henriquez DPM         [1]   Current Outpatient Medications   Medication Sig Dispense Refill    PRAVACHOL 20 MG Oral Tab Take 1 tablet (20 mg total) by mouth nightly.      lisinopril-hydroCHLOROthiazide 20-12.5 MG Oral Tab Take 1 tablet by mouth 2 (two) times daily. 180 tablet 3    AMLODIPINE 5 MG Oral Tab TAKE 1 TABLET EVERY DAY 90 tablet 3    Mometasone Furoate 0.1 % External Cream Apply 1 Application. topically 2 (two) times daily as needed. 60 g 3    aspirin (ASPIRIN LOW DOSE) 81 MG Oral Tab EC Take 1 tablet (81 mg total) by mouth once daily. 90 tablet 1    naproxen 220 MG Oral Tab Take 1-2 tablets (220-440 mg total) by mouth 2 (two) times daily as needed.     [2]   Past Medical History:   Back problem    DJD (degenerative joint disease) of lumbar spine    High blood pressure    Other and unspecified hyperlipidemia    Rotator cuff disorder    LT    Type II or unspecified type diabetes mellitus without mention of complication, not stated as uncontrolled    Unspecified essential hypertension    Varicose vein    Visual impairment    reading glasses   [3]   Past Surgical History:  Procedure Laterality Date    Colonoscopy N/A 10/14/2015    Procedure: COLONOSCOPY;  Surgeon: Abundio Randall MD;  Location:  ENDOSCOPY    Other Right 8/25/2015    endo luminal laser vein ablation    Other Left 9/18/2015    endo luminal laser vein ablation    Other surgical history      para    Other surgical history  2000    vericose vein removal     Tonsillectomy  as a child    Vasectomy  1984   [4]   Family History  Problem Relation Age of Onset    Hypertension Father    [5]   Social History  Socioeconomic History    Marital status:    Tobacco Use    Smoking status: Some Days     Current packs/day: 0.00     Types:  Cigarettes, Cigars     Start date:      Last attempt to quit: 2019     Years since quittin.4    Smokeless tobacco: Never    Tobacco comments:     Relapsed in [id number] smokes about every other day   Vaping Use    Vaping status: Never Used   Substance and Sexual Activity    Alcohol use: Yes     Comment: 2023: \"A few beers a day\"    Drug use: Yes     Types: Cannabis     Comment: Rarely   Other Topics Concern    Caffeine Concern Yes     Comment: At least 4 cups of coffee daily    Exercise No    Seat Belt No    Special Diet No    Stress Concern No    Weight Concern No

## 2025-07-23 ENCOUNTER — OFFICE VISIT (OUTPATIENT)
Facility: LOCATION | Age: 70
End: 2025-07-23
Payer: MEDICARE

## 2025-07-23 VITALS
OXYGEN SATURATION: 95 % | DIASTOLIC BLOOD PRESSURE: 69 MMHG | TEMPERATURE: 98 F | SYSTOLIC BLOOD PRESSURE: 132 MMHG | HEART RATE: 50 BPM

## 2025-07-23 DIAGNOSIS — K40.90 RIGHT INGUINAL HERNIA: Primary | ICD-10-CM

## 2025-07-23 PROCEDURE — 3078F DIAST BP <80 MM HG: CPT | Performed by: SURGERY

## 2025-07-23 PROCEDURE — 99203 OFFICE O/P NEW LOW 30 MIN: CPT | Performed by: SURGERY

## 2025-07-23 PROCEDURE — 1159F MED LIST DOCD IN RCRD: CPT | Performed by: SURGERY

## 2025-07-23 PROCEDURE — 3075F SYST BP GE 130 - 139MM HG: CPT | Performed by: SURGERY

## 2025-07-23 PROCEDURE — 1170F FXNL STATUS ASSESSED: CPT | Performed by: SURGERY

## 2025-07-23 PROCEDURE — 1160F RVW MEDS BY RX/DR IN RCRD: CPT | Performed by: SURGERY

## 2025-07-23 NOTE — PROGRESS NOTES
Date of Surgery:   TBD   DX: No diagnosis found.     Surgery Site :RIGHT / LEFT / BILATERAL: right    [x] Open  [] Laparoscopic  [] Robotic    [] Cholecystectomy with ICG   [] Cholecystectomy with IOC (need C-arm)    [] Hernia   [] Ventral  [] Umbilical  [] Inguinal  [] Incisional  [] Paraesophageal [] Component Separation (TAR)     [] Excision of Lipomas     Anesthesia: ANESTHESIA TYPE: MAC    Location: [] [facility]      [x] [facility]       [] [facility]   [] [facility]      [] [facility]  [] [facility]            Admission Status: ED[facility] OR ADMIT LOCATION: [facility]    SA Needed: Yes/No: Yes      Clearance Needed:  []Medical Clearance   []Cardiac Clearance:   []Anticoagulation Management:    []Renal:   []Neuro:     Hx sleep apnea: Yes/No: No        Pacemaker:  Yes/No: No      Latex allergies: No evidence of latex-specific IgE     Pre-Admission Testing:  Surgeon's Personalized order Set.   Prophylactic Antibiotics Order: Prophylactic antibiotic protocol

## 2025-07-23 NOTE — PROGRESS NOTES
The following individual(s) verbally consented to be recorded using ambient AI listening technology and understand that they can each withdraw their consent to this listening technology at any point by asking the clinician to turn off or pause the recording:    Patient name: Pato Estevez

## 2025-07-23 NOTE — H&P
New Patient Consult  Pato Estevez  10/31/1955    7/23/2025           The following individual(s) verbally consented to be recorded using ambient AI listening technology and understand that they can each withdraw their consent to this listening technology at any point by asking the clinician to turn off or pause the recording:    Patient name: Pato Estevez               This patient was referred by Abdullahi Blount MD.      History of Present Illness  Sreedhar Estevez is a 69 year old male who presents with a right-sided groin hernia. He was referred by Dr. Benjamin for evaluation of his hernia.    Right inguinal hernia  - Present for at least 3 to 3.5 years, initially noticed during lifting a heavy load (his wife)  - Initial onset associated with a sensation of something happening in the groin  - Progressive increase in bulging over the past 6 months  - Bulge was previously intermittent and would sometimes resolve spontaneously, now more persistent and prominent  - Discomfort managed by reclining and applying slight pressure, which often reduces the bulge  - No prior diagnostic imaging (CT or MRI) performed for the hernia  - No previous surgical interventions for this condition    Surgical anxiety  - Significant fear of surgery, described as 'deathly afraid'  - Concern about not waking up from anesthesia  - Anxiety has delayed seeking treatment despite awareness of the hernia    Scrotal anatomy  - Left testicle has always hung lower than the right, present since childhood        Review of Systems:  Constitutional: No fevers or chills or recent weight loss, no recent sick contacts  Respiratory: Denies any shortness of breath, productive cough, dyspnea  Cardiovascular: No chest pain  Gastrointestinal: No nausea, vomiting, diarrhea, abdominal pain  Urologic: Denies any dysuria    Past Medical History:    Back problem    DJD (degenerative joint disease) of lumbar spine    High blood pressure    High cholesterol     Other and unspecified hyperlipidemia    Rotator cuff disorder    LT    Type II or unspecified type diabetes mellitus without mention of complication, not stated as uncontrolled    Unspecified essential hypertension    Varicose vein    Visual impairment    reading glasses       Past Surgical History:   Procedure Laterality Date    Colonoscopy N/A 10/14/2015    Procedure: COLONOSCOPY;  Surgeon: Abundio Randall MD;  Location:  ENDOSCOPY    Other Right 2015    endo luminal laser vein ablation    Other Left 2015    endo luminal laser vein ablation    Other surgical history      para    Other surgical history      vericose vein removal     Stab phlebectomy, varicose veins, 1 extremity; <20 incisions      Tonsillectomy  as a child    Vasectomy         Social History     Socioeconomic History    Marital status:      Spouse name: Not on file    Number of children: Not on file    Years of education: Not on file    Highest education level: Not on file   Occupational History    Not on file   Tobacco Use    Smoking status: Some Days     Current packs/day: 0.00     Types: Cigarettes, Cigars     Start date:      Last attempt to quit: 2019     Years since quittin.5    Smokeless tobacco: Never    Tobacco comments:     Relapsed in 6455-8626 smokes about every other day   Vaping Use    Vaping status: Never Used   Substance and Sexual Activity    Alcohol use: Yes     Alcohol/week: 3.0 standard drinks of alcohol     Types: 3 Cans of beer per week     Comment: most days none. when i do maybe 3 beers a week    Drug use: Yes     Types: Cannabis     Comment: Rarely    Sexual activity: Not on file   Other Topics Concern    Caffeine Concern No    Exercise No    Seat Belt No    Special Diet No    Stress Concern No    Weight Concern No   Social History Narrative    Not on file     Social Drivers of Health     Food Insecurity: Not on file   Transportation Needs: Not on file   Stress: Not on file    Housing Stability: Not on file         Current Outpatient Medications:     PRAVACHOL 20 MG Oral Tab, Take 1 tablet (20 mg total) by mouth nightly., Disp: , Rfl:     lisinopril-hydroCHLOROthiazide 20-12.5 MG Oral Tab, Take 1 tablet by mouth 2 (two) times daily., Disp: 180 tablet, Rfl: 3    AMLODIPINE 5 MG Oral Tab, TAKE 1 TABLET EVERY DAY, Disp: 90 tablet, Rfl: 3    Mometasone Furoate 0.1 % External Cream, Apply 1 Application. topically 2 (two) times daily as needed., Disp: 60 g, Rfl: 3    aspirin (ASPIRIN LOW DOSE) 81 MG Oral Tab EC, Take 1 tablet (81 mg total) by mouth once daily., Disp: 90 tablet, Rfl: 1    naproxen 220 MG Oral Tab, Take 1-2 tablets (220-440 mg total) by mouth as needed in the morning and 1-2 tablets (220-440 mg total) as needed in the evening., Disp: , Rfl:     No Known Allergies    Family History   Problem Relation Age of Onset    Hypertension Father        Objective/Physical Exam:  Physical Exam  GENITOURINARY: Right groin hernia palpable. Fullness noted on left groin, solid on palpation, no impulse. Digital examination of groin bilaterally, (+) impulse on RIGHT only. RIGHT inguinal hernia reducable.  General: Alert, orientated x3.  Cooperative.  No apparent distress.  Vital Signs:  Blood pressure 132/69, pulse 50, temperature 97.9 °F (36.6 °C), temperature source Temporal, SpO2 95%. There is no height or weight on file to calculate BMI.  HEENT: NC/AT   Lungs: Even, unlabored  Cardiac: Regular rate   Abdomen: Soft, ND, NT, no R/G   Extremities:  SMAE  Skin: Warm & dry      Labs/Radiology:    Results        Discussed:   The potential surgical and non-surgical treatment options were discussed with the patient.  The potential risks, benefits, complications outcomes/recovery and alternatives to any proposed surgery were also fully reviewed with the patient. Any proposed surgical procedure was described in detail.  Expected postoperative pain, recuperation and postoperative course was discussed.   The patient verbalizes understanding.  All questions from the patient were discussed in detail to the patient's satisfaction.  No other questions were presented at this time.         Assessment/Plan:    Assessment & Plan  Inguinal hernia  Chronic right inguinal hernia, symptomatic with increased bulging and discomfort. Reducible with manual pressure. Explained surgical repair options, including open repair with local anesthesia and mesh reinforcement. Discussed risks of bowel obstruction and strangulation if irreducible. He is considering surgical repair.  - Educated on manual reduction techniques.  - Discussed surgical repair options, including open repair with local anesthesia and mesh reinforcement.  - Scheduled surgical repair for August, pending decision.  - Instructed on signs of bowel obstruction or strangulation and to seek immediate care if these occur.          Thank you for allowing me to participate in your patient's care.         Raffy Bailey MD    Please note that this report has been produced using speech recognition software and may contain errors related to that system including but not limited to errors in grammar, punctuation and spelling as well as words and phrases that possibly may have been recognized inappropriately.  If there are any questions or concerns please contact the dictating provider for clarification.

## 2025-07-25 ENCOUNTER — TELEPHONE (OUTPATIENT)
Facility: LOCATION | Age: 70
End: 2025-07-25

## 2025-07-25 DIAGNOSIS — K40.90 RIGHT INGUINAL HERNIA: Primary | ICD-10-CM

## 2025-07-28 ENCOUNTER — TELEPHONE (OUTPATIENT)
Facility: LOCATION | Age: 70
End: 2025-07-28

## 2025-07-28 NOTE — TELEPHONE ENCOUNTER
Patient called wondering if he should continue taking his blood pressure medication prior to his surgery on 8/18. Please advise.

## 2025-07-28 NOTE — TELEPHONE ENCOUNTER
Spoke with patient to let him know that he should continue to take blood pressure medications as prescribed and that Pre Admission Testing at the the hospital will let him know if he can take it the day of surgery.  Also reviewed stopping aspirin 7 days prior to surgery.  Patient verbalized understanding.

## 2025-08-02 LAB — AMB EXT COLOGUARD RESULT: NEGATIVE

## 2025-08-04 ENCOUNTER — TELEPHONE (OUTPATIENT)
Facility: LOCATION | Age: 70
End: 2025-08-04

## 2025-08-07 ENCOUNTER — EKG ENCOUNTER (OUTPATIENT)
Dept: LAB | Age: 70
End: 2025-08-07
Attending: SURGERY

## 2025-08-07 ENCOUNTER — LABORATORY ENCOUNTER (OUTPATIENT)
Dept: LAB | Age: 70
End: 2025-08-07
Attending: SURGERY

## 2025-08-07 DIAGNOSIS — K40.90 RIGHT INGUINAL HERNIA: ICD-10-CM

## 2025-08-07 LAB
ANION GAP SERPL CALC-SCNC: 5 MMOL/L (ref 0–18)
ATRIAL RATE: 58 BPM
BUN BLD-MCNC: 10 MG/DL (ref 9–23)
CALCIUM BLD-MCNC: 9.6 MG/DL (ref 8.7–10.6)
CHLORIDE SERPL-SCNC: 101 MMOL/L (ref 98–112)
CO2 SERPL-SCNC: 27 MMOL/L (ref 21–32)
CREAT BLD-MCNC: 0.93 MG/DL (ref 0.7–1.3)
EGFRCR SERPLBLD CKD-EPI 2021: 89 ML/MIN/1.73M2 (ref 60–?)
FASTING STATUS PATIENT QL REPORTED: YES
GLUCOSE BLD-MCNC: 78 MG/DL (ref 70–99)
OSMOLALITY SERPL CALC.SUM OF ELEC: 274 MOSM/KG (ref 275–295)
P AXIS: 66 DEGREES
P-R INTERVAL: 146 MS
POTASSIUM SERPL-SCNC: 3.8 MMOL/L (ref 3.5–5.1)
Q-T INTERVAL: 448 MS
QRS DURATION: 84 MS
QTC CALCULATION (BEZET): 439 MS
R AXIS: 55 DEGREES
SODIUM SERPL-SCNC: 133 MMOL/L (ref 136–145)
T AXIS: 57 DEGREES
VENTRICULAR RATE: 58 BPM

## 2025-08-07 PROCEDURE — 80048 BASIC METABOLIC PNL TOTAL CA: CPT

## 2025-08-07 PROCEDURE — 93010 ELECTROCARDIOGRAM REPORT: CPT | Performed by: INTERNAL MEDICINE

## 2025-08-07 PROCEDURE — 36415 COLL VENOUS BLD VENIPUNCTURE: CPT

## 2025-08-07 PROCEDURE — 93005 ELECTROCARDIOGRAM TRACING: CPT

## 2025-08-18 ENCOUNTER — ANESTHESIA (OUTPATIENT)
Dept: SURGERY | Facility: HOSPITAL | Age: 70
End: 2025-08-18

## 2025-08-18 ENCOUNTER — ANESTHESIA EVENT (OUTPATIENT)
Dept: SURGERY | Facility: HOSPITAL | Age: 70
End: 2025-08-18

## 2025-08-18 ENCOUNTER — HOSPITAL ENCOUNTER (OUTPATIENT)
Facility: HOSPITAL | Age: 70
Setting detail: HOSPITAL OUTPATIENT SURGERY
Discharge: HOME | End: 2025-08-18
Attending: SURGERY | Admitting: SURGERY

## 2025-08-18 VITALS
RESPIRATION RATE: 16 BRPM | HEIGHT: 70 IN | SYSTOLIC BLOOD PRESSURE: 115 MMHG | WEIGHT: 196 LBS | DIASTOLIC BLOOD PRESSURE: 67 MMHG | BODY MASS INDEX: 28.06 KG/M2 | TEMPERATURE: 97 F | HEART RATE: 56 BPM | OXYGEN SATURATION: 95 %

## 2025-08-18 DIAGNOSIS — G89.18 POSTOPERATIVE PAIN: ICD-10-CM

## 2025-08-18 DIAGNOSIS — K40.90 RIGHT INGUINAL HERNIA: Primary | ICD-10-CM

## 2025-08-18 PROCEDURE — 88302 TISSUE EXAM BY PATHOLOGIST: CPT | Performed by: SURGERY

## 2025-08-18 DEVICE — IMPLANTABLE DEVICE: Type: IMPLANTABLE DEVICE | Site: INGUINAL | Status: FUNCTIONAL

## 2025-08-18 RX ORDER — OXYCODONE HYDROCHLORIDE 5 MG/1
5 TABLET ORAL EVERY 6 HOURS PRN
Qty: 10 TABLET | Refills: 0 | Status: SHIPPED | OUTPATIENT
Start: 2025-08-18

## 2025-08-18 RX ORDER — ASPIRIN 81 MG/1
81 TABLET ORAL
Status: SHIPPED | COMMUNITY
Start: 2025-08-19

## 2025-08-18 RX ORDER — SODIUM CHLORIDE, SODIUM LACTATE, POTASSIUM CHLORIDE, CALCIUM CHLORIDE 600; 310; 30; 20 MG/100ML; MG/100ML; MG/100ML; MG/100ML
INJECTION, SOLUTION INTRAVENOUS CONTINUOUS
Status: DISCONTINUED | OUTPATIENT
Start: 2025-08-18 | End: 2025-08-18

## 2025-08-18 RX ORDER — NICOTINE POLACRILEX 4 MG
30 LOZENGE BUCCAL
Status: DISCONTINUED | OUTPATIENT
Start: 2025-08-18 | End: 2025-08-18

## 2025-08-18 RX ORDER — OXYCODONE HYDROCHLORIDE 5 MG/1
15 TABLET ORAL EVERY 4 HOURS PRN
Status: DISCONTINUED | OUTPATIENT
Start: 2025-08-18 | End: 2025-08-18

## 2025-08-18 RX ORDER — MIDAZOLAM HYDROCHLORIDE 1 MG/ML
1 INJECTION INTRAMUSCULAR; INTRAVENOUS EVERY 5 MIN PRN
Status: DISCONTINUED | OUTPATIENT
Start: 2025-08-18 | End: 2025-08-18

## 2025-08-18 RX ORDER — HYDROMORPHONE HYDROCHLORIDE 1 MG/ML
0.6 INJECTION, SOLUTION INTRAMUSCULAR; INTRAVENOUS; SUBCUTANEOUS EVERY 5 MIN PRN
Status: DISCONTINUED | OUTPATIENT
Start: 2025-08-18 | End: 2025-08-18

## 2025-08-18 RX ORDER — NALOXONE HYDROCHLORIDE 0.4 MG/ML
0.08 INJECTION, SOLUTION INTRAMUSCULAR; INTRAVENOUS; SUBCUTANEOUS AS NEEDED
Status: DISCONTINUED | OUTPATIENT
Start: 2025-08-18 | End: 2025-08-18

## 2025-08-18 RX ORDER — LIDOCAINE HYDROCHLORIDE 10 MG/ML
INJECTION, SOLUTION INFILTRATION; PERINEURAL AS NEEDED
Status: DISCONTINUED | OUTPATIENT
Start: 2025-08-18 | End: 2025-08-18 | Stop reason: HOSPADM

## 2025-08-18 RX ORDER — BUPIVACAINE HYDROCHLORIDE 2.5 MG/ML
INJECTION, SOLUTION EPIDURAL; INFILTRATION; INTRACAUDAL; PERINEURAL AS NEEDED
Status: DISCONTINUED | OUTPATIENT
Start: 2025-08-18 | End: 2025-08-18 | Stop reason: HOSPADM

## 2025-08-18 RX ORDER — OXYCODONE HYDROCHLORIDE 5 MG/1
10 TABLET ORAL EVERY 4 HOURS PRN
Status: DISCONTINUED | OUTPATIENT
Start: 2025-08-18 | End: 2025-08-18

## 2025-08-18 RX ORDER — GLYCOPYRROLATE 0.2 MG/ML
INJECTION, SOLUTION INTRAMUSCULAR; INTRAVENOUS AS NEEDED
Status: DISCONTINUED | OUTPATIENT
Start: 2025-08-18 | End: 2025-08-18 | Stop reason: SURG

## 2025-08-18 RX ORDER — DEXTROSE MONOHYDRATE 25 G/50ML
50 INJECTION, SOLUTION INTRAVENOUS
Status: DISCONTINUED | OUTPATIENT
Start: 2025-08-18 | End: 2025-08-18

## 2025-08-18 RX ORDER — HYDROMORPHONE HYDROCHLORIDE 1 MG/ML
0.4 INJECTION, SOLUTION INTRAMUSCULAR; INTRAVENOUS; SUBCUTANEOUS EVERY 5 MIN PRN
Status: DISCONTINUED | OUTPATIENT
Start: 2025-08-18 | End: 2025-08-18

## 2025-08-18 RX ORDER — ACETAMINOPHEN 500 MG
1000 TABLET ORAL ONCE
Status: DISCONTINUED | OUTPATIENT
Start: 2025-08-18 | End: 2025-08-18 | Stop reason: HOSPADM

## 2025-08-18 RX ORDER — KETOROLAC TROMETHAMINE 30 MG/ML
INJECTION, SOLUTION INTRAMUSCULAR; INTRAVENOUS AS NEEDED
Status: DISCONTINUED | OUTPATIENT
Start: 2025-08-18 | End: 2025-08-18 | Stop reason: SURG

## 2025-08-18 RX ORDER — HYDROMORPHONE HYDROCHLORIDE 1 MG/ML
0.2 INJECTION, SOLUTION INTRAMUSCULAR; INTRAVENOUS; SUBCUTANEOUS EVERY 5 MIN PRN
Status: DISCONTINUED | OUTPATIENT
Start: 2025-08-18 | End: 2025-08-18

## 2025-08-18 RX ORDER — DIPHENHYDRAMINE HYDROCHLORIDE 50 MG/ML
12.5 INJECTION, SOLUTION INTRAMUSCULAR; INTRAVENOUS AS NEEDED
Status: DISCONTINUED | OUTPATIENT
Start: 2025-08-18 | End: 2025-08-18

## 2025-08-18 RX ORDER — NICOTINE POLACRILEX 4 MG
15 LOZENGE BUCCAL
Status: DISCONTINUED | OUTPATIENT
Start: 2025-08-18 | End: 2025-08-18

## 2025-08-18 RX ORDER — DEXAMETHASONE SODIUM PHOSPHATE 4 MG/ML
VIAL (ML) INJECTION AS NEEDED
Status: DISCONTINUED | OUTPATIENT
Start: 2025-08-18 | End: 2025-08-18 | Stop reason: SURG

## 2025-08-18 RX ORDER — MEPERIDINE HYDROCHLORIDE 25 MG/ML
12.5 INJECTION INTRAMUSCULAR; INTRAVENOUS; SUBCUTANEOUS AS NEEDED
Status: DISCONTINUED | OUTPATIENT
Start: 2025-08-18 | End: 2025-08-18

## 2025-08-18 RX ORDER — METOCLOPRAMIDE HYDROCHLORIDE 5 MG/ML
10 INJECTION INTRAMUSCULAR; INTRAVENOUS EVERY 8 HOURS PRN
Status: DISCONTINUED | OUTPATIENT
Start: 2025-08-18 | End: 2025-08-18

## 2025-08-18 RX ORDER — MIDAZOLAM HYDROCHLORIDE 1 MG/ML
INJECTION INTRAMUSCULAR; INTRAVENOUS AS NEEDED
Status: DISCONTINUED | OUTPATIENT
Start: 2025-08-18 | End: 2025-08-18 | Stop reason: SURG

## 2025-08-18 RX ORDER — ACETAMINOPHEN 10 MG/ML
INJECTION, SOLUTION INTRAVENOUS AS NEEDED
Status: DISCONTINUED | OUTPATIENT
Start: 2025-08-18 | End: 2025-08-18 | Stop reason: SURG

## 2025-08-18 RX ORDER — ONDANSETRON 2 MG/ML
4 INJECTION INTRAMUSCULAR; INTRAVENOUS EVERY 6 HOURS PRN
Status: DISCONTINUED | OUTPATIENT
Start: 2025-08-18 | End: 2025-08-18

## 2025-08-18 RX ORDER — OXYCODONE HYDROCHLORIDE 5 MG/1
5 TABLET ORAL EVERY 4 HOURS PRN
Status: DISCONTINUED | OUTPATIENT
Start: 2025-08-18 | End: 2025-08-18

## 2025-08-18 RX ORDER — ONDANSETRON 2 MG/ML
INJECTION INTRAMUSCULAR; INTRAVENOUS AS NEEDED
Status: DISCONTINUED | OUTPATIENT
Start: 2025-08-18 | End: 2025-08-18 | Stop reason: SURG

## 2025-08-18 RX ORDER — PHENYLEPHRINE HCL 10 MG/ML
VIAL (ML) INJECTION AS NEEDED
Status: DISCONTINUED | OUTPATIENT
Start: 2025-08-18 | End: 2025-08-18 | Stop reason: SURG

## 2025-08-18 RX ORDER — LIDOCAINE HYDROCHLORIDE 10 MG/ML
INJECTION, SOLUTION EPIDURAL; INFILTRATION; INTRACAUDAL; PERINEURAL AS NEEDED
Status: DISCONTINUED | OUTPATIENT
Start: 2025-08-18 | End: 2025-08-18 | Stop reason: SURG

## 2025-08-18 RX ADMIN — PHENYLEPHRINE HCL 200 MCG: 10 MG/ML VIAL (ML) INJECTION at 14:42:00

## 2025-08-18 RX ADMIN — LIDOCAINE HYDROCHLORIDE 50 MG: 10 INJECTION, SOLUTION EPIDURAL; INFILTRATION; INTRACAUDAL; PERINEURAL at 14:19:00

## 2025-08-18 RX ADMIN — MIDAZOLAM HYDROCHLORIDE 2 MG: 1 INJECTION INTRAMUSCULAR; INTRAVENOUS at 14:18:00

## 2025-08-18 RX ADMIN — PHENYLEPHRINE HCL 100 MCG: 10 MG/ML VIAL (ML) INJECTION at 14:53:00

## 2025-08-18 RX ADMIN — PHENYLEPHRINE HCL 100 MCG: 10 MG/ML VIAL (ML) INJECTION at 14:27:00

## 2025-08-18 RX ADMIN — PHENYLEPHRINE HCL 100 MCG: 10 MG/ML VIAL (ML) INJECTION at 14:36:00

## 2025-08-18 RX ADMIN — GLYCOPYRROLATE 0.2 MG: 0.2 INJECTION, SOLUTION INTRAMUSCULAR; INTRAVENOUS at 14:31:00

## 2025-08-18 RX ADMIN — SODIUM CHLORIDE, SODIUM LACTATE, POTASSIUM CHLORIDE, CALCIUM CHLORIDE: 600; 310; 30; 20 INJECTION, SOLUTION INTRAVENOUS at 15:33:00

## 2025-08-18 RX ADMIN — SODIUM CHLORIDE, SODIUM LACTATE, POTASSIUM CHLORIDE, CALCIUM CHLORIDE: 600; 310; 30; 20 INJECTION, SOLUTION INTRAVENOUS at 16:18:00

## 2025-08-18 RX ADMIN — SODIUM CHLORIDE, SODIUM LACTATE, POTASSIUM CHLORIDE, CALCIUM CHLORIDE: 600; 310; 30; 20 INJECTION, SOLUTION INTRAVENOUS at 14:16:00

## 2025-08-18 RX ADMIN — DEXAMETHASONE SODIUM PHOSPHATE 8 MG: 4 MG/ML VIAL (ML) INJECTION at 14:35:00

## 2025-08-18 RX ADMIN — KETOROLAC TROMETHAMINE 30 MG: 30 INJECTION, SOLUTION INTRAMUSCULAR; INTRAVENOUS at 15:55:00

## 2025-08-18 RX ADMIN — ONDANSETRON 4 MG: 2 INJECTION INTRAMUSCULAR; INTRAVENOUS at 14:35:00

## 2025-08-18 RX ADMIN — ACETAMINOPHEN 1000 MG: 10 INJECTION, SOLUTION INTRAVENOUS at 15:55:00

## (undated) DIAGNOSIS — E78.00 HYPERCHOLESTEREMIA: Chronic | ICD-10-CM

## (undated) DIAGNOSIS — I10 ESSENTIAL HYPERTENSION, BENIGN: Chronic | ICD-10-CM

## (undated) DEVICE — SUT COAT VCRL 2-0 27IN SH ABSRB UD 26MM 1/2

## (undated) DEVICE — Device

## (undated) DEVICE — SUT PROL 2-0 30IN SH NABSRB BLU L26MM 1/2 CIR

## (undated) DEVICE — GLOVE SUR 7.5 SENSICARE PI PIP CRM PWD F

## (undated) DEVICE — APPLICATOR PREP 26ML CHG 2% ISO ALC 70%

## (undated) DEVICE — GLOVE SUR 7.5 SENSICARE PI PIP GRN PWD F

## (undated) DEVICE — SUT COAT VCRL 2-0 27IN RB-1 ABSRB VLT 17MM 1/

## (undated) DEVICE — SUT MCRYL 4-0 18IN PS-2 ABSRB UD 19MM 3/8 CIR

## (undated) DEVICE — PACK LAP MINI

## (undated) DEVICE — SLEEVE COMPR MD KNEE LEN SGL USE KENDALL SCD

## (undated) DEVICE — COVER LT HNDL RIG FOR SUR CAM DISP

## (undated) DEVICE — SOLUTION IRRIG 1000ML 0.9% NACL USP BTL

## (undated) NOTE — MR AVS SNAPSHOT
[place]  [address] [facility] [address]  [address] [id number] [phone]               Thank you for choosing us for your health care visit with Pamela Watkins MD.  We are glad to serve you and happy to provide you with this guardado Take 1 tablet by mouth 3 (three) times daily as needed. Commonly known as:  NORCO           Lisinopril-Hydrochlorothiazide 20-12.5 MG Tabs   Take 2 tablets by mouth daily.            MetFORMIN HCl 850 MG Tabs   TAKE 1 TABLET BY MOUTH THREE TIMES DAILY   C Make half your plate fruits and vegetables Highly refined, white starches including white bread, rice and pasta   Eat plenty of protein, keep the fat content low Sugars:  sodas and sports drinks, candies and desserts   Eat plenty of low-fat dairy products

## (undated) NOTE — LETTER
03/31/20        Yohana Simser Herb  [address]      Dear Lindaann Harada records indicate that you have outstanding lab work and or testing that was ordered for you and has not yet been completed:  Orders Placed This Encounter

## (undated) NOTE — LETTER
12/02/21        Riya Estevez  [address]      Dear Taisha Mena records indicate that you have outstanding lab work and or testing that was ordered for you and has not yet been completed:  Orders Placed This Encounter

## (undated) NOTE — LETTER
Date: 2021    Patient Name: Deirdre Hatchet  : 10/31/1955        To Whom it may concern:     This letter has been written at the patient's request. The above patient is requesting to be excused from attending jury duty due to underlying medica